# Patient Record
Sex: MALE | Race: WHITE | URBAN - METROPOLITAN AREA
[De-identification: names, ages, dates, MRNs, and addresses within clinical notes are randomized per-mention and may not be internally consistent; named-entity substitution may affect disease eponyms.]

---

## 2018-05-21 ENCOUNTER — OFFICE VISIT (OUTPATIENT)
Dept: FAMILY MEDICINE | Facility: CLINIC | Age: 48
End: 2018-05-21
Payer: COMMERCIAL

## 2018-05-21 VITALS
DIASTOLIC BLOOD PRESSURE: 73 MMHG | WEIGHT: 169 LBS | SYSTOLIC BLOOD PRESSURE: 112 MMHG | BODY MASS INDEX: 25.03 KG/M2 | OXYGEN SATURATION: 99 % | HEART RATE: 77 BPM | HEIGHT: 69 IN | RESPIRATION RATE: 18 BRPM | TEMPERATURE: 96.7 F

## 2018-05-21 DIAGNOSIS — M25.531 RIGHT WRIST PAIN: Primary | ICD-10-CM

## 2018-05-21 PROCEDURE — 99203 OFFICE O/P NEW LOW 30 MIN: CPT | Performed by: INTERNAL MEDICINE

## 2018-05-21 RX ORDER — CLONAZEPAM 1 MG/1
0.5 TABLET ORAL AT BEDTIME
Status: ON HOLD | COMMUNITY
End: 2019-05-14

## 2018-05-21 RX ORDER — PROPRANOLOL HYDROCHLORIDE 40 MG/1
40 TABLET ORAL
COMMUNITY

## 2018-05-21 RX ORDER — CLOZAPINE 100 MG/1
500 TABLET ORAL AT BEDTIME
COMMUNITY

## 2018-05-21 RX ORDER — LEVETIRACETAM 500 MG/1
500 TABLET ORAL 2 TIMES DAILY
COMMUNITY

## 2018-05-21 RX ORDER — MULTIVIT-MIN/FERROUS GLUCONATE 9 MG/15 ML
LIQUID (ML) ORAL
COMMUNITY
Start: 2017-10-20 | End: 2019-05-09

## 2018-05-21 RX ORDER — LEVOTHYROXINE SODIUM 25 UG/1
25 TABLET ORAL
COMMUNITY
Start: 2017-09-15 | End: 2019-05-09

## 2018-05-21 RX ORDER — SENNOSIDES 8.6 MG
TABLET ORAL
COMMUNITY
Start: 2017-09-15 | End: 2019-05-09

## 2018-05-21 RX ORDER — ACETAMINOPHEN 500 MG
500 TABLET ORAL 2 TIMES DAILY PRN
Qty: 30 TABLET | Refills: 1 | Status: SHIPPED | OUTPATIENT
Start: 2018-05-21 | End: 2018-07-23

## 2018-05-21 NOTE — PROGRESS NOTES
SUBJECTIVE:                                                    Octavio Loya is a 48 year old male who presents to clinic today for the following health issues:        ED/UC Followup:    Facility:  [facility]  Date of visit: 5/7/2018  Reason for visit: Wrist pains  Current Status: Right wrist pain symptoms still present         Wrist Pain    Duration:     Since: 2 weeks ago              Specific cause: bended wrist wrong causing wrist sprain    Description:      Location of pain: right wrist     Character of pain: dull     Pain radiation: right hand    Intensity: moderate    History:      Pain interferes with job: yes     History of similar pain problems: no     Any previous MRI or X-rays: yes     Therapies tried without relief: wrist splint    Alleviating factors:      Improved by: rest    Precipitating factors:    Worsened by: range of motion movements at the right wrist    Accompanying Signs & Symptoms: none            Current Medications:     Current Outpatient Prescriptions   Medication Sig Dispense Refill     clonazePAM (KLONOPIN) 1 MG tablet Take 1 mg by mouth       cloZAPine (CLOZARIL) 100 MG tablet Take 500 mg by mouth       levETIRAcetam (KEPPRA) 500 MG tablet Take 500 mg by mouth       levothyroxine (SYNTHROID/LEVOTHROID) 25 MCG tablet Take 25 mcg by mouth       Multiple Vitamins-Minerals (CERTA-SOPHIA) LIQD 1 tab daily       propranolol (INDERAL) 40 MG tablet Take 40 mg by mouth       ranitidine (ZANTAC) 150 MG tablet Take 150 mg by mouth       sennosides (SENOKOT) 8.6 MG tablet 1 TO 3 TABS DAILY FOR CONSTIPATION           Allergies:    No Known Allergies         Past Medical History:     Past Medical History:   Diagnosis Date     Wrist pain, right          Past Surgical History:   History reviewed. No pertinent surgical history.      Family Medical History:   History reviewed. No pertinent family history.      Social History:     Social History     Social History     Marital status: Single      "Spouse name: N/A     Number of children: N/A     Years of education: N/A     Occupational History     Not on file.     Social History Main Topics     Smoking status: Never Smoker     Smokeless tobacco: Current User     Alcohol use No     Drug use: No     Sexual activity: Not Currently     Other Topics Concern     Not on file     Social History Narrative     No narrative on file           Review of System:     Constitutional: Negative for fever or chills  Skin: Negative for rashes  Ears/Nose/Throat: Negative for nasal congestion, sore throat  Respiratory: No shortness of breath, dyspnea on exertion, cough, or hemoptysis  Cardiovascular: Negative for chest pain  Gastrointestinal: Negative for nausea, vomiting  Genitourinary: Negative for dysuria, hematuria  Musculoskeletal: Positive for right wrist pains  Neurologic: Negative for headaches  Psychiatric: Negative for depression, anxiety  Hematologic/Lymphatic/Immunologic: Negative  Endocrine: Negative  Behavioral: Negative for tobacco use       Physical Exam:   /73 (BP Location: Left arm, Patient Position: Chair, Cuff Size: Adult Regular)  Pulse 77  Temp 96.7  F (35.9  C) (Tympanic)  Resp 18  Ht 5' 9\" (1.753 m)  Wt 169 lb (76.7 kg)  SpO2 99%  BMI 24.96 kg/m2    GENERAL: alert and no distress  EYES: eyes grossly normal to inspection, and conjunctivae and sclerae normal  HENT: Normocephalic atraumatic. Nose and mouth without ulcers or lesions  NECK: supple  RESP: lungs clear to auscultation   CV: regular rate and rhythm, normal S1 S2  LYMPH: no peripheral edema   ABDOMEN: nondistended  MS: right wrist pains noted with range of motion movements at the right wrist, pain extending to the right hand  SKIN: no suspicious lesions or rashes  NEURO: Alert & Oriented x 3.   PSYCH: mentation appears normal, affect normal        Diagnostic Test Results:     XR WRIST RT 3 VIEW5/7/2018  [facility]   Result Impression   IMPRESSION:    Normal exam.   Result " Narrative   EXAM: X-RAY WRIST    DATE: 5/7/2018 10:24 AM    COMPARISON: None    CLINICAL DATA: Increasing right wrist pain.    ADDITIONAL CLINICAL DATA: M25.531 Pain in right wrist    TECHNIQUE: Frontal, oblique, and lateral views of the right wrist were obtained.    FINDINGS:     No fracture or dislocation. No joint space narrowing, osteophytosis, bony erosions, or periosteal reaction.         ASSESSMENT/PLAN:       (M25.531) Right wrist pain  (primary encounter diagnosis)  Comment: persistent right wrist pain since 2 weeks ago, recent outside Xray obtained at [facility] was negative for acute fractures or dislocation.  Plan: I have ordered ORTHO  REFERRAL with [facility] ortho clinic for further evaluation. In the meantime, I have also prescribed low dose acetaminophen (TYLENOL) 500 MG tablet 2xDay as needed for pain relief.    Follow Up Plan:     Patient is instructed to return to Internal Medicine clinic for follow-up visit on an as-needed basis going forward.        Olga Silva MD  Internal Medicine  [facility]

## 2018-05-21 NOTE — MR AVS SNAPSHOT
After Visit Summary   5/21/2018    Octavio Loya    MRN: [id number]           Patient Information     Date Of Birth          1970        Visit Information        Provider Department      5/21/2018 11:40 AM Olga Silva MD [facility]        Today's Diagnoses     Right wrist pain    -  1       Follow-ups after your visit        Additional Services     ORTHO  REFERRAL       [facility] is referring you to the Orthopedic  Services at [facility].       The  Representative will assist you in the coordination of your Orthopedic and Musculoskeletal Care as prescribed by your physician.    The  Representative will call you within 1 business day to help schedule your appointment, or you may contact the  Representative at:    All areas ~ [phone]     Type of Referral : Surgical / Specialist       Timeframe requested: 3 - 5 days    Coverage of these services is subject to the terms and limitations of your health insurance plan.  Please call member services at your health plan with any benefit or coverage questions.      If X-rays, CT or MRI's have been performed, please contact the facility where they were done to arrange for , prior to your scheduled appointment.  Please bring this referral request to your appointment and present it to your specialist.                  Your next 10 appointments already scheduled     May 25, 2018 10:30 AM CDT   New Visit with Hayden Randle MD   [facility] ORTHOPEDIC SURGERY ([facility])    [address] [address]   [phone]              Who to contact     If you have questions or need follow up information about today's clinic visit or your schedule please contact [facility] directly at [phone].  Normal or non-critical lab and imaging results will be communicated to you by Teri  "letter or phone within 4 business days after the clinic has received the results. If you do not hear from us within 7 days, please contact the clinic through [email or web address] or phone. If you have a critical or abnormal lab result, we will notify you by phone as soon as possible.  Submit refill requests through [email or web address] or call your pharmacy and they will forward the refill request to us. Please allow 3 business days for your refill to be completed.          Additional Information About Your Visit        [email or web address] Information     [email or web address] lets you send messages to your doctor, view your test results, renew your prescriptions, schedule appointments and more. To sign up, go to [email or web address] . Click on \"Log in\" on the left side of the screen, which will take you to the Welcome page. Then click on \"Sign up Now\" on the right side of the page.     You will be asked to enter the access code listed below, as well as some personal information. Please follow the directions to create your username and password.     Your access code is: [email or web address]  Expires: 2018 12:22 PM     Your access code will  in 90 days. If you need help or a new code, please call your [facility] clinic or [phone].        Care EveryWhere ID     This is your Care EveryWhere ID. This could be used by other organizations to access your [facility] medical records  [id number]        Your Vitals Were     Pulse Temperature Respirations Height Pulse Oximetry BMI (Body Mass Index)    77 96.7  F (35.9  C) (Tympanic) 18 5' 9\" (1.753 m) 99% 24.96 kg/m2       Blood Pressure from Last 3 Encounters:   18 112/73    Weight from Last 3 Encounters:   18 169 lb (76.7 kg)              We Performed the Following     ORTHO  REFERRAL          Today's Medication Changes          These changes are accurate as of 18 12:22 PM.  If you have any questions, ask your nurse or doctor.               Start taking these medicines.        Dose/Directions "    acetaminophen 500 MG tablet   Commonly known as:  TYLENOL   Used for:  Right wrist pain   Started by:  Olga Silva MD        Dose:  500 mg   Take 1 tablet (500 mg) by mouth 2 times daily as needed for mild pain   Quantity:  30 tablet   Refills:  1            Where to get your medicines      These medications were sent to [facility] [place], [place] - [address] [facility] [address]  [address] [address] [address], [facility] [address]     Phone:  [phone]     acetaminophen 500 MG tablet                Primary Care Provider Office Phone # Fax #    Olga Silva [phone] [address]  [facility] [address]        Equal Access to Services     [facility]: Hadii khloe arellano hadasho Soomaali, waaxda luqadaha, qaybta kaalmada adeegyada, rakesh navarro . So [facility] [phone].    ATENCIÓN: Si habla español, tiene a cisneros disposición servicios gratuitos de asistencia lingüística. [facility] [phone].    We comply with applicable federal civil rights laws and [place] laws. We do not discriminate on the basis of race, color, national origin, age, disability, sex, sexual orientation, or gender identity.            Thank you!     Thank you for choosing [facility]  for your care. Our goal is always to provide you with excellent care. Hearing back from our patients is one way we can continue to improve our services. Please take a few minutes to complete the written survey that you may receive in the mail after your visit with us. Thank you!             Your Updated Medication List - Protect others around you: Learn how to safely use, store and throw away your medicines at www.disposemymeds.org.          This list is accurate as of 5/21/18 12:22 PM.  Always use your most recent med list.                   Brand Name Dispense Instructions for use Diagnosis    acetaminophen 500 MG tablet    TYLENOL    30 tablet    Take 1 tablet (500 mg) by  mouth 2 times daily as needed for mild pain    Right wrist pain       CERTA-SOPHIA Liqd      1 tab daily        clonazePAM 1 MG tablet    klonoPIN     Take 1 mg by mouth        cloZAPine 100 MG tablet    CLOZARIL     Take 500 mg by mouth        levETIRAcetam 500 MG tablet    KEPPRA     Take 500 mg by mouth        levothyroxine 25 MCG tablet    SYNTHROID/LEVOTHROID     Take 25 mcg by mouth        propranolol 40 MG tablet    INDERAL     Take 40 mg by mouth        ranitidine 150 MG tablet    ZANTAC     Take 150 mg by mouth        sennosides 8.6 MG tablet    SENOKOT     1 TO 3 TABS DAILY FOR CONSTIPATION

## 2018-05-25 ENCOUNTER — OFFICE VISIT (OUTPATIENT)
Dept: ORTHOPEDICS | Facility: CLINIC | Age: 48
End: 2018-05-25
Payer: COMMERCIAL

## 2018-05-25 VITALS — BODY MASS INDEX: 24.96 KG/M2 | SYSTOLIC BLOOD PRESSURE: 115 MMHG | WEIGHT: 169 LBS | DIASTOLIC BLOOD PRESSURE: 74 MMHG

## 2018-05-25 DIAGNOSIS — M77.8 RIGHT HAND TENDONITIS: Primary | ICD-10-CM

## 2018-05-25 PROCEDURE — 29125 APPL SHORT ARM SPLINT STATIC: CPT | Performed by: ORTHOPAEDIC SURGERY

## 2018-05-25 PROCEDURE — 99203 OFFICE O/P NEW LOW 30 MIN: CPT | Mod: 25 | Performed by: ORTHOPAEDIC SURGERY

## 2018-05-25 RX ORDER — METHYLPREDNISOLONE 4 MG
TABLET, DOSE PACK ORAL
Qty: 21 TABLET | Refills: 0 | Status: SHIPPED | OUTPATIENT
Start: 2018-05-25 | End: 2019-05-09

## 2018-05-25 NOTE — PATIENT INSTRUCTIONS
A cast is used to protect an injured body part and allow it to heal by limiting the amount of motion occurring around the injury  that cuts off blood flow that does not go away, even when you lift the body part above the level of your heart .     Fever after itching. It may be related to an infection.     Fluid draining from your skin under the cast         Ice 3-4 times daily.   Ibuprofen 200mg, 3 tabs, 3 times daily.

## 2018-05-25 NOTE — PROGRESS NOTES
HISTORY OF PRESENT ILLNESS:    Octavio Loya is a 48 year old male who is seen for right wrist pain on request of .  There is no specific documented trauma.  He basically started out of the blue.  However, it is possible that he might have fallen in the middle of the night since he takes Klonopin.  He does not recall doing anything repetitive.  He works as a .  He has gone through the treatments of Medrol Dosepak and thumb spica wrist splint.  Since the onset of 3 weeks ago, the situation has not improved significantly although swelling is self has improved.  No specific numbness or tingling sensation otherwise.  Present symptoms: Pain and swelling right wrist   treatments tried to this point: Splinting and Medrol Dosepak   orthopedic PMH: None    Past Medical History:   Diagnosis Date     Wrist pain, right        No past surgical history on file.  Schizophrenia disorder he is in a group home    No family history on file.  Father with history of stroke and mother with a history of cancer    Social History     Social History     Marital status: Single     Spouse name: N/A     Number of children: N/A     Years of education: N/A     Occupational History     Not on file.     Social History Main Topics     Smoking status: Never Smoker     Smokeless tobacco: Current User     Alcohol use No     Drug use: No     Sexual activity: Not Currently     Other Topics Concern     Not on file     Social History Narrative       Current Outpatient Prescriptions   Medication Sig Dispense Refill     acetaminophen (TYLENOL) 500 MG tablet Take 1 tablet (500 mg) by mouth 2 times daily as needed for mild pain 30 tablet 1     clonazePAM (KLONOPIN) 1 MG tablet Take 1 mg by mouth       cloZAPine (CLOZARIL) 100 MG tablet Take 500 mg by mouth       levETIRAcetam (KEPPRA) 500 MG tablet Take 500 mg by mouth       levothyroxine (SYNTHROID/LEVOTHROID) 25 MCG tablet Take 25 mcg by mouth       methylPREDNISolone (MEDROL DOSEPAK) 4 MG  tablet Follow package instructions 21 tablet 0     Multiple Vitamins-Minerals (CERTA-SOPHIA) LIQD 1 tab daily       propranolol (INDERAL) 40 MG tablet Take 40 mg by mouth       ranitidine (ZANTAC) 150 MG tablet Take 150 mg by mouth       sennosides (SENOKOT) 8.6 MG tablet 1 TO 3 TABS DAILY FOR CONSTIPATION         No Known Allergies    REVIEW OF SYSTEMS:  CONSTITUTIONAL:  NEGATIVE for fever, chills, change in weight  INTEGUMENTARY/SKIN:  NEGATIVE for worrisome rashes, moles or lesions  EYES:  NEGATIVE for vision changes or irritation  ENT/MOUTH:  NEGATIVE for ear, mouth and throat problems  RESP:  NEGATIVE for significant cough or SOB  BREAST:  NEGATIVE for masses, tenderness or discharge  CV:  NEGATIVE for chest pain, palpitations or peripheral edema  GI: Constipation or reflux   : Frequency  MUSCULOSKELETAL:  See HPI above  NEURO: Dizziness  ENDOCRINE:  NEGATIVE for temperature intolerance, skin/hair changes  HEME/ALLERGY/IMMUNE:  NEGATIVE for bleeding problems  PSYCHIATRIC: History of schizophrenia      PHYSICAL EXAM:  /74 (BP Location: Right arm, Patient Position: Right side, Cuff Size: Adult Regular)  Wt 169 lb (76.7 kg)  BMI 24.96 kg/m2  Body mass index is 24.96 kg/(m^2).   GENERAL APPEARANCE: healthy, alert and no distress   HEENT: No apparent thyroid megaly. Clear sclera with normal ocular movement  RESPIRATORY: No labored breathing  SKIN: no suspicious lesions or rashes  NEURO: Normal strength and tone, mentation intact and speech normal  VASCULAR: Good pulses, and capillary refill   LYMPH: no lymphadenopathy   PSYCH:  mentation appears normal and affect normal/bright    MUSCULOSKELETAL:  Right wrist is a slightly swollen especially over the dorsum  Palpable tenderness over the dorsum of the right wrist and proximal hand  No significant erythema or warmth noted otherwise  Finger movement is well-maintained but is socially with the pain  Wrist itself is not swollen  Swelling is noted to be on the  synovial tissue of the extensor tendons  No specific pain at the anatomic snuffbox  No pain along the first dorsal compartment  Volar side is benign without tenderness at the A1 pulleys or catching or locking    ASSESSMENT:  Right hand extensor tenosynovitis      PLAN:  He will be given a note for him to use left and only for 3 weeks for his work  Another course of Medrol Dosepak  Ice 5-10 minutes 3-4 times a day  Instead of Velcro wrist splint, he will be given a resting Ortho-Glass splint including the fingers so that he can rest more adequately  Follow-up if problem persists.    .    Imaging Interpretation:   None taken today.  The results of x-rays from [facility] was reviewed.    Hayden Randle MD  Department of Orthopedic Surgery        Disclaimer: This note consists of symbols derived from keyboarding, dictation and/or voice recognition software. As a result, there may be errors in the script that have gone undetected. Please consider this when interpreting information found in this chart.

## 2018-05-25 NOTE — LETTER
May 25, 2018      Octavio Loya  [address] [place] [address]        To Whom It May Concern:    Octavio Loya was seen in our clinic. He may return to work with the following: left hand use only  on or about 6/15/18.      Sincerely,        Hayden Randle MD

## 2018-05-25 NOTE — LETTER
5/25/2018         RE: Octavio Loya  [address]  [place] [address]        Dear Colleague,    Thank you for referring your patient, Octavio Loya, to the [facility] ORTHOPEDIC SURGERY. Please see a copy of my visit note below.    HISTORY OF PRESENT ILLNESS:    Octavio Loya is a 48 year old male who is seen for right wrist pain on request of .  There is no specific documented trauma.  He basically started out of the blue.  However, it is possible that he might have fallen in the middle of the night since he takes Klonopin.  He does not recall doing anything repetitive.  He works as a .  He has gone through the treatments of Medrol Dosepak and thumb spica wrist splint.  Since the onset of 3 weeks ago, the situation has not improved significantly although swelling is self has improved.  No specific numbness or tingling sensation otherwise.  Present symptoms: Pain and swelling right wrist   treatments tried to this point: Splinting and Medrol Dosepak   orthopedic PMH: None    Past Medical History:   Diagnosis Date     Wrist pain, right        No past surgical history on file.  Schizophrenia disorder he is in a group home    No family history on file.  Father with history of stroke and mother with a history of cancer    Social History     Social History     Marital status: Single     Spouse name: N/A     Number of children: N/A     Years of education: N/A     Occupational History     Not on file.     Social History Main Topics     Smoking status: Never Smoker     Smokeless tobacco: Current User     Alcohol use No     Drug use: No     Sexual activity: Not Currently     Other Topics Concern     Not on file     Social History Narrative     No narrative on file       Current Outpatient Prescriptions   Medication Sig Dispense Refill     acetaminophen (TYLENOL) 500 MG tablet Take 1 tablet (500 mg) by mouth 2 times daily as needed for mild pain 30 tablet 1     clonazePAM (KLONOPIN) 1 MG  tablet Take 1 mg by mouth       cloZAPine (CLOZARIL) 100 MG tablet Take 500 mg by mouth       levETIRAcetam (KEPPRA) 500 MG tablet Take 500 mg by mouth       levothyroxine (SYNTHROID/LEVOTHROID) 25 MCG tablet Take 25 mcg by mouth       methylPREDNISolone (MEDROL DOSEPAK) 4 MG tablet Follow package instructions 21 tablet 0     Multiple Vitamins-Minerals (CERTA-SOPHIA) LIQD 1 tab daily       propranolol (INDERAL) 40 MG tablet Take 40 mg by mouth       ranitidine (ZANTAC) 150 MG tablet Take 150 mg by mouth       sennosides (SENOKOT) 8.6 MG tablet 1 TO 3 TABS DAILY FOR CONSTIPATION         No Known Allergies    REVIEW OF SYSTEMS:  CONSTITUTIONAL:  NEGATIVE for fever, chills, change in weight  INTEGUMENTARY/SKIN:  NEGATIVE for worrisome rashes, moles or lesions  EYES:  NEGATIVE for vision changes or irritation  ENT/MOUTH:  NEGATIVE for ear, mouth and throat problems  RESP:  NEGATIVE for significant cough or SOB  BREAST:  NEGATIVE for masses, tenderness or discharge  CV:  NEGATIVE for chest pain, palpitations or peripheral edema  GI: Constipation or reflux   : Frequency  MUSCULOSKELETAL:  See HPI above  NEURO: Dizziness  ENDOCRINE:  NEGATIVE for temperature intolerance, skin/hair changes  HEME/ALLERGY/IMMUNE:  NEGATIVE for bleeding problems  PSYCHIATRIC: History of schizophrenia      PHYSICAL EXAM:  There were no vitals taken for this visit.  There is no height or weight on file to calculate BMI.   GENERAL APPEARANCE: healthy, alert and no distress   HEENT: No apparent thyroid megaly. Clear sclera with normal ocular movement  RESPIRATORY: No labored breathing  SKIN: no suspicious lesions or rashes  NEURO: Normal strength and tone, mentation intact and speech normal  VASCULAR: Good pulses, and capillary refill   LYMPH: no lymphadenopathy   PSYCH:  mentation appears normal and affect normal/bright    MUSCULOSKELETAL:  Right wrist is a slightly swollen especially over the dorsum  Palpable tenderness over the dorsum of the  right wrist and proximal hand  No significant erythema or warmth noted otherwise  Finger movement is well-maintained but is socially with the pain  Wrist itself is not swollen  Swelling is noted to be on the synovial tissue of the extensor tendons  No specific pain at the anatomic snuffbox  No pain along the first dorsal compartment  Volar side is benign without tenderness at the A1 pulleys or catching or locking    ASSESSMENT:  Right hand extensor tenosynovitis      PLAN:  He will be given a note for him to use left and only for 3 weeks for his work  Another course of Medrol Dosepak  Ice 5-10 minutes 3-4 times a day  Instead of Velcro wrist splint, he will be given a resting Ortho-Glass splint including the fingers so that he can rest more adequately  Follow-up if problem persists.    .    Imaging Interpretation:   None taken today.  The results of x-rays from [facility] was reviewed.    Hayden Randle MD  Department of Orthopedic Surgery        Disclaimer: This note consists of symbols derived from keyboarding, dictation and/or voice recognition software. As a result, there may be errors in the script that have gone undetected. Please consider this when interpreting information found in this chart.    Again, thank you for allowing me to participate in the care of your patient.        Sincerely,        Hayden Randle MD

## 2018-05-25 NOTE — MR AVS SNAPSHOT
"              After Visit Summary   5/25/2018    Octavio Loya    MRN: [id number]           Patient Information     Date Of Birth          1970        Visit Information        Provider Department      5/25/2018 10:30 AM Hayden Randle MD [facility]        Today's Diagnoses     Right hand tendonitis    -  1      Care Instructions      A cast is used to protect an injured body part and allow it to heal by limiting the amount of motion occurring around the injury  that cuts off blood flow that does not go away, even when you lift the body part above the level of your heart .     Fever after itching. It may be related to an infection.     Fluid draining from your skin under the cast         Ice 3-4 times daily.   Ibuprofen 200mg, 3 tabs, 3 times daily.             Follow-ups after your visit        Who to contact     If you have questions or need follow up information about today's clinic visit or your schedule please contact [facility] directly at [phone].  Normal or non-critical lab and imaging results will be communicated to you by InnoPadhart, letter or phone within 4 business days after the clinic has received the results. If you do not hear from us within 7 days, please contact the clinic through [facility] [place] [facility] or phone. If you have a critical or abnormal lab result, we will notify you by phone as soon as possible.  Submit refill requests through [employer] or call your pharmacy and they will forward the refill request to us. Please allow 3 business days for your refill to be completed.          Additional Information About Your Visit        InnoPadharGreystripe Information     [employer] lets you send messages to your doctor, view your test results, renew your prescriptions, schedule appointments and more. To sign up, go to [email or web address] . Click on \"Log in\" on the left side of the screen, which will take you to the Welcome page. Then click on \"Sign up Now\" on the right " side of the page.     You will be asked to enter the access code listed below, as well as some personal information. Please follow the directions to create your username and password.     Your access code is: [email or web address]  Expires: 2018 12:22 PM     Your access code will  in 90 days. If you need help or a new code, please call your [place] clinic or [phone].        Care EveryWhere ID     This is your Care EveryWhere ID. This could be used by other organizations to access your [place] medical records  [id number]         Blood Pressure from Last 3 Encounters:   18 112/73    Weight from Last 3 Encounters:   18 169 lb (76.7 kg)              Today, you had the following     No orders found for display         Today's Medication Changes          These changes are accurate as of 18 11:18 AM.  If you have any questions, ask your nurse or doctor.               Start taking these medicines.        Dose/Directions    methylPREDNISolone 4 MG tablet   Commonly known as:  MEDROL DOSEPAK   Used for:  Right hand tendonitis        Follow package instructions   Quantity:  21 tablet   Refills:  0            Where to get your medicines      These medications were sent to [facility] [address]  [address], [facility] [address]     Phone:  [phone]     methylPREDNISolone 4 MG tablet                Primary Care Provider Office Phone # Fax #    Olga Patricio Silva [phone] [address]  [facility] [address]        Equal Access to Services     [facility] Hospital[facility]IL[facility] AH: Hadii aad ku hadasho Soomaali, waaxda luqadaha, qaybta kaalmada adeegyada, rakesh schaefer. So [facility] [phone].    ATENCIÓN: Si habla español, tiene a cisneros disposición servicios gratuitos de asistencia lingüística. Llame al [phone].    We comply with applicable federal civil rights laws and [place] laws. We do not discriminate  on the basis of race, color, national origin, age, disability, sex, sexual orientation, or gender identity.            Thank you!     Thank you for choosing [facility]  for your care. Our goal is always to provide you with excellent care. Hearing back from our patients is one way we can continue to improve our services. Please take a few minutes to complete the written survey that you may receive in the mail after your visit with us. Thank you!             Your Updated Medication List - Protect others around you: Learn how to safely use, store and throw away your medicines at www.disposemymeds.org.          This list is accurate as of 5/25/18 11:18 AM.  Always use your most recent med list.                   Brand Name Dispense Instructions for use Diagnosis    acetaminophen 500 MG tablet    TYLENOL    30 tablet    Take 1 tablet (500 mg) by mouth 2 times daily as needed for mild pain    Right wrist pain       CERTA-SOPHIA Liqd      1 tab daily        clonazePAM 1 MG tablet    klonoPIN     Take 1 mg by mouth        cloZAPine 100 MG tablet    CLOZARIL     Take 500 mg by mouth        levETIRAcetam 500 MG tablet    KEPPRA     Take 500 mg by mouth        levothyroxine 25 MCG tablet    SYNTHROID/LEVOTHROID     Take 25 mcg by mouth        methylPREDNISolone 4 MG tablet    MEDROL DOSEPAK    21 tablet    Follow package instructions    Right hand tendonitis       propranolol 40 MG tablet    INDERAL     Take 40 mg by mouth        ranitidine 150 MG tablet    ZANTAC     Take 150 mg by mouth        sennosides 8.6 MG tablet    SENOKOT     1 TO 3 TABS DAILY FOR CONSTIPATION

## 2018-07-23 DIAGNOSIS — M25.531 RIGHT WRIST PAIN: ICD-10-CM

## 2018-07-25 RX ORDER — ACETAMINOPHEN 500 MG
500 TABLET ORAL 2 TIMES DAILY PRN
Qty: 30 TABLET | Refills: 1 | Status: SHIPPED | OUTPATIENT
Start: 2018-07-25 | End: 2019-05-09

## 2018-07-25 NOTE — TELEPHONE ENCOUNTER
"    Requested Prescriptions   Pending Prescriptions Disp Refills     acetaminophen (TYLENOL) 500 MG tablet 30 tablet 1     Sig: Take 1 tablet (500 mg) by mouth 2 times daily as needed for mild pain    Analgesics (Non-Narcotic Tylenol and ASA Only) Passed    7/23/2018  3:06 PM       Passed - Recent (12 mo) or future (30 days) visit within the authorizing provider's specialty    Patient had office visit in the last 12 months or has a visit in the next 30 days with authorizing provider or within the authorizing provider's specialty.  See \"Patient Info\" tab in inbasket, or \"Choose Columns\" in Meds & Orders section of the refill encounter.           Passed - Patient is 7 months old or older    If patient is a peds patient of the age 7 mos -12 years, ok to refill using weight-based dosing.     If >3g daily and/or sig is not \"prn\", check for liver enzymes. If normal in the last year, ok to refill.  If not, refer to the provider.            "

## 2018-07-25 NOTE — TELEPHONE ENCOUNTER
Prescription approved per [facility] [place].  Nicole Kapadia RN- Triage [employer]

## 2019-05-09 ENCOUNTER — APPOINTMENT (OUTPATIENT)
Dept: CT IMAGING | Facility: CLINIC | Age: 49
End: 2019-05-09
Attending: EMERGENCY MEDICINE
Payer: COMMERCIAL

## 2019-05-09 ENCOUNTER — HOSPITAL ENCOUNTER (INPATIENT)
Facility: CLINIC | Age: 49
LOS: 5 days | Discharge: NURSING, REHAB OR LONG-TERM CARE | End: 2019-05-15
Attending: EMERGENCY MEDICINE | Admitting: INTERNAL MEDICINE
Payer: COMMERCIAL

## 2019-05-09 ENCOUNTER — APPOINTMENT (OUTPATIENT)
Dept: GENERAL RADIOLOGY | Facility: CLINIC | Age: 49
End: 2019-05-09
Attending: EMERGENCY MEDICINE
Payer: COMMERCIAL

## 2019-05-09 DIAGNOSIS — G89.18 POSTOPERATIVE PAIN: Primary | ICD-10-CM

## 2019-05-09 DIAGNOSIS — F25.1 SCHIZOAFFECTIVE DISORDER, DEPRESSIVE TYPE (H): ICD-10-CM

## 2019-05-09 DIAGNOSIS — B96.20 E COLI BACTEREMIA: ICD-10-CM

## 2019-05-09 DIAGNOSIS — R78.81 E COLI BACTEREMIA: ICD-10-CM

## 2019-05-09 DIAGNOSIS — A41.9 SEPSIS, DUE TO UNSPECIFIED ORGANISM: ICD-10-CM

## 2019-05-09 PROBLEM — G93.40 ENCEPHALOPATHY: Status: ACTIVE | Noted: 2019-05-09

## 2019-05-09 LAB
ALBUMIN SERPL-MCNC: 3.2 G/DL (ref 3.4–5)
ALBUMIN UR-MCNC: 10 MG/DL
ALP SERPL-CCNC: 140 U/L (ref 40–150)
ALT SERPL W P-5'-P-CCNC: 18 U/L (ref 0–70)
AMPHETAMINES UR QL SCN: NEGATIVE
ANION GAP SERPL CALCULATED.3IONS-SCNC: 12 MMOL/L (ref 3–14)
APAP SERPL-MCNC: <2 MG/L (ref 10–20)
APPEARANCE CSF: CLEAR
APPEARANCE UR: CLEAR
AST SERPL W P-5'-P-CCNC: 12 U/L (ref 0–45)
BACTERIA #/AREA URNS HPF: ABNORMAL /HPF
BARBITURATES UR QL: NEGATIVE
BASOPHILS # BLD AUTO: 0 10E9/L (ref 0–0.2)
BASOPHILS NFR BLD AUTO: 0 %
BENZODIAZ UR QL: NEGATIVE
BILIRUB SERPL-MCNC: 1.1 MG/DL (ref 0.2–1.3)
BILIRUB UR QL STRIP: NEGATIVE
BUN SERPL-MCNC: 18 MG/DL (ref 7–30)
CALCIUM SERPL-MCNC: 9.1 MG/DL (ref 8.5–10.1)
CANNABINOIDS UR QL SCN: NEGATIVE
CHLORIDE SERPL-SCNC: 100 MMOL/L (ref 94–109)
CO2 SERPL-SCNC: 20 MMOL/L (ref 20–32)
COCAINE UR QL: NEGATIVE
COLOR CSF: COLORLESS
COLOR UR AUTO: YELLOW
CREAT SERPL-MCNC: 1.37 MG/DL (ref 0.66–1.25)
DIFFERENTIAL METHOD BLD: ABNORMAL
EOSINOPHIL # BLD AUTO: 0 10E9/L (ref 0–0.7)
EOSINOPHIL NFR BLD AUTO: 0 %
ERYTHROCYTE [DISTWIDTH] IN BLOOD BY AUTOMATED COUNT: 13.6 % (ref 10–15)
ETHANOL SERPL-MCNC: <0.01 G/DL
GFR SERPL CREATININE-BSD FRML MDRD: 60 ML/MIN/{1.73_M2}
GLUCOSE CSF-MCNC: 76 MG/DL (ref 40–70)
GLUCOSE SERPL-MCNC: 246 MG/DL (ref 70–99)
GLUCOSE UR STRIP-MCNC: NEGATIVE MG/DL
GRAM STN SPEC: NORMAL
HCT VFR BLD AUTO: 36 % (ref 40–53)
HGB BLD-MCNC: 12.5 G/DL (ref 13.3–17.7)
HGB UR QL STRIP: NEGATIVE
IMM GRANULOCYTES # BLD: 0.1 10E9/L (ref 0–0.4)
IMM GRANULOCYTES NFR BLD: 0.4 %
INTERPRETATION ECG - MUSE: NORMAL
KETONES UR STRIP-MCNC: NEGATIVE MG/DL
LACTATE BLD-SCNC: 1.2 MMOL/L (ref 0.7–2)
LACTATE SERPL-SCNC: 3.6 MMOL/L (ref 0.4–2)
LEUKOCYTE ESTERASE UR QL STRIP: NEGATIVE
LYMPHOCYTES # BLD AUTO: 0.6 10E9/L (ref 0.8–5.3)
LYMPHOCYTES NFR BLD AUTO: 3.1 %
Lab: NORMAL
MCH RBC QN AUTO: 29.8 PG (ref 26.5–33)
MCHC RBC AUTO-ENTMCNC: 34.7 G/DL (ref 31.5–36.5)
MCV RBC AUTO: 86 FL (ref 78–100)
MONOCYTES # BLD AUTO: 2.3 10E9/L (ref 0–1.3)
MONOCYTES NFR BLD AUTO: 11.5 %
NEUTROPHILS # BLD AUTO: 17.2 10E9/L (ref 1.6–8.3)
NEUTROPHILS NFR BLD AUTO: 85 %
NITRATE UR QL: NEGATIVE
NRBC # BLD AUTO: 0 10*3/UL
NRBC BLD AUTO-RTO: 0 /100
OPIATES UR QL SCN: NEGATIVE
PCP UR QL SCN: NEGATIVE
PH UR STRIP: 6.5 PH (ref 5–7)
PLATELET # BLD AUTO: 114 10E9/L (ref 150–450)
POTASSIUM SERPL-SCNC: 3.3 MMOL/L (ref 3.4–5.3)
PROT CSF-MCNC: 51 MG/DL (ref 15–60)
PROT SERPL-MCNC: 7.7 G/DL (ref 6.8–8.8)
RBC # BLD AUTO: 4.2 10E12/L (ref 4.4–5.9)
RBC # CSF MANUAL: 9 /UL (ref 0–2)
RBC #/AREA URNS AUTO: <1 /HPF (ref 0–2)
SALICYLATES SERPL-MCNC: <2 MG/DL
SODIUM SERPL-SCNC: 132 MMOL/L (ref 133–144)
SOURCE: ABNORMAL
SP GR UR STRIP: 1.01 (ref 1–1.03)
SPECIMEN SOURCE: NORMAL
TUBE # CSF: 4 #
UROBILINOGEN UR STRIP-MCNC: NORMAL MG/DL (ref 0–2)
WBC # BLD AUTO: 20.3 10E9/L (ref 4–11)
WBC # CSF MANUAL: 4 /UL (ref 0–5)
WBC #/AREA URNS AUTO: 1 /HPF (ref 0–5)

## 2019-05-09 PROCEDURE — 87800 DETECT AGNT MULT DNA DIREC: CPT | Performed by: EMERGENCY MEDICINE

## 2019-05-09 PROCEDURE — 80320 DRUG SCREEN QUANTALCOHOLS: CPT | Performed by: EMERGENCY MEDICINE

## 2019-05-09 PROCEDURE — 80053 COMPREHEN METABOLIC PANEL: CPT | Performed by: EMERGENCY MEDICINE

## 2019-05-09 PROCEDURE — G0378 HOSPITAL OBSERVATION PER HR: HCPCS

## 2019-05-09 PROCEDURE — 87040 BLOOD CULTURE FOR BACTERIA: CPT | Performed by: EMERGENCY MEDICINE

## 2019-05-09 PROCEDURE — 93005 ELECTROCARDIOGRAM TRACING: CPT

## 2019-05-09 PROCEDURE — 96361 HYDRATE IV INFUSION ADD-ON: CPT

## 2019-05-09 PROCEDURE — 40000936 ZZH STATISTIC OUTPATIENT (NON-OBS) NIGHT

## 2019-05-09 PROCEDURE — 62270 DX LMBR SPI PNXR: CPT

## 2019-05-09 PROCEDURE — 87205 SMEAR GRAM STAIN: CPT | Performed by: EMERGENCY MEDICINE

## 2019-05-09 PROCEDURE — 87015 SPECIMEN INFECT AGNT CONCNTJ: CPT | Performed by: EMERGENCY MEDICINE

## 2019-05-09 PROCEDURE — 25800030 ZZH RX IP 258 OP 636: Performed by: INTERNAL MEDICINE

## 2019-05-09 PROCEDURE — 80329 ANALGESICS NON-OPIOID 1 OR 2: CPT | Performed by: EMERGENCY MEDICINE

## 2019-05-09 PROCEDURE — 71046 X-RAY EXAM CHEST 2 VIEWS: CPT

## 2019-05-09 PROCEDURE — 87077 CULTURE AEROBIC IDENTIFY: CPT | Performed by: EMERGENCY MEDICINE

## 2019-05-09 PROCEDURE — 82945 GLUCOSE OTHER FLUID: CPT | Performed by: EMERGENCY MEDICINE

## 2019-05-09 PROCEDURE — 40000935 ZZH STATISTIC OUTPATIENT (NON-OBS) EVE

## 2019-05-09 PROCEDURE — 85025 COMPLETE CBC W/AUTO DIFF WBC: CPT | Performed by: EMERGENCY MEDICINE

## 2019-05-09 PROCEDURE — 84157 ASSAY OF PROTEIN OTHER: CPT | Performed by: EMERGENCY MEDICINE

## 2019-05-09 PROCEDURE — 70450 CT HEAD/BRAIN W/O DYE: CPT

## 2019-05-09 PROCEDURE — 99220 ZZC INITIAL OBSERVATION CARE,LEVL III: CPT | Performed by: INTERNAL MEDICINE

## 2019-05-09 PROCEDURE — 81001 URINALYSIS AUTO W/SCOPE: CPT | Performed by: EMERGENCY MEDICINE

## 2019-05-09 PROCEDURE — 25000128 H RX IP 250 OP 636: Performed by: EMERGENCY MEDICINE

## 2019-05-09 PROCEDURE — 89050 BODY FLUID CELL COUNT: CPT | Performed by: EMERGENCY MEDICINE

## 2019-05-09 PROCEDURE — 009U3ZX DRAINAGE OF SPINAL CANAL, PERCUTANEOUS APPROACH, DIAGNOSTIC: ICD-10-PCS | Performed by: EMERGENCY MEDICINE

## 2019-05-09 PROCEDURE — 99285 EMERGENCY DEPT VISIT HI MDM: CPT | Mod: 25

## 2019-05-09 PROCEDURE — 96365 THER/PROPH/DIAG IV INF INIT: CPT

## 2019-05-09 PROCEDURE — 25000132 ZZH RX MED GY IP 250 OP 250 PS 637: Performed by: INTERNAL MEDICINE

## 2019-05-09 PROCEDURE — 80307 DRUG TEST PRSMV CHEM ANLYZR: CPT | Performed by: EMERGENCY MEDICINE

## 2019-05-09 PROCEDURE — 87529 HSV DNA AMP PROBE: CPT | Performed by: INTERNAL MEDICINE

## 2019-05-09 PROCEDURE — 40000061 ZZH STATISTIC EEG TIME EA 10 MIN

## 2019-05-09 PROCEDURE — 95816 EEG AWAKE AND DROWSY: CPT

## 2019-05-09 PROCEDURE — 87186 SC STD MICRODIL/AGAR DIL: CPT | Performed by: EMERGENCY MEDICINE

## 2019-05-09 PROCEDURE — 87070 CULTURE OTHR SPECIMN AEROBIC: CPT | Performed by: EMERGENCY MEDICINE

## 2019-05-09 PROCEDURE — 83605 ASSAY OF LACTIC ACID: CPT | Performed by: EMERGENCY MEDICINE

## 2019-05-09 RX ORDER — AMOXICILLIN 250 MG
2 CAPSULE ORAL 2 TIMES DAILY PRN
Status: DISCONTINUED | OUTPATIENT
Start: 2019-05-09 | End: 2019-05-12

## 2019-05-09 RX ORDER — SODIUM CHLORIDE 9 MG/ML
INJECTION, SOLUTION INTRAVENOUS CONTINUOUS
Status: DISCONTINUED | OUTPATIENT
Start: 2019-05-09 | End: 2019-05-10

## 2019-05-09 RX ORDER — ACETAMINOPHEN 325 MG/1
650 TABLET ORAL EVERY 4 HOURS PRN
Status: DISCONTINUED | OUTPATIENT
Start: 2019-05-09 | End: 2019-05-15 | Stop reason: HOSPADM

## 2019-05-09 RX ORDER — POTASSIUM CHLORIDE 29.8 MG/ML
20 INJECTION INTRAVENOUS
Status: DISCONTINUED | OUTPATIENT
Start: 2019-05-09 | End: 2019-05-15 | Stop reason: HOSPADM

## 2019-05-09 RX ORDER — SODIUM CHLORIDE, SODIUM LACTATE, POTASSIUM CHLORIDE, CALCIUM CHLORIDE 600; 310; 30; 20 MG/100ML; MG/100ML; MG/100ML; MG/100ML
INJECTION, SOLUTION INTRAVENOUS CONTINUOUS
Status: CANCELLED | OUTPATIENT
Start: 2019-05-09

## 2019-05-09 RX ORDER — ONDANSETRON 2 MG/ML
4 INJECTION INTRAMUSCULAR; INTRAVENOUS EVERY 6 HOURS PRN
Status: DISCONTINUED | OUTPATIENT
Start: 2019-05-09 | End: 2019-05-15 | Stop reason: HOSPADM

## 2019-05-09 RX ORDER — POTASSIUM CHLORIDE 7.45 MG/ML
10 INJECTION INTRAVENOUS
Status: DISCONTINUED | OUTPATIENT
Start: 2019-05-09 | End: 2019-05-15 | Stop reason: HOSPADM

## 2019-05-09 RX ORDER — BISACODYL 10 MG
10 SUPPOSITORY, RECTAL RECTAL DAILY PRN
Status: DISCONTINUED | OUTPATIENT
Start: 2019-05-09 | End: 2019-05-15 | Stop reason: HOSPADM

## 2019-05-09 RX ORDER — POTASSIUM CHLORIDE 1500 MG/1
20-40 TABLET, EXTENDED RELEASE ORAL
Status: DISCONTINUED | OUTPATIENT
Start: 2019-05-09 | End: 2019-05-15 | Stop reason: HOSPADM

## 2019-05-09 RX ORDER — POTASSIUM CHLORIDE 1.5 G/1.58G
20-40 POWDER, FOR SOLUTION ORAL
Status: DISCONTINUED | OUTPATIENT
Start: 2019-05-09 | End: 2019-05-15 | Stop reason: HOSPADM

## 2019-05-09 RX ORDER — AMOXICILLIN 250 MG
1 CAPSULE ORAL 2 TIMES DAILY PRN
Status: DISCONTINUED | OUTPATIENT
Start: 2019-05-09 | End: 2019-05-12

## 2019-05-09 RX ORDER — SENNOSIDES 8.6 MG
1 TABLET ORAL 2 TIMES DAILY PRN
COMMUNITY

## 2019-05-09 RX ORDER — BUSPIRONE HYDROCHLORIDE 30 MG/1
30 TABLET ORAL 2 TIMES DAILY
COMMUNITY

## 2019-05-09 RX ORDER — POLYETHYLENE GLYCOL 3350 17 G/17G
17 POWDER, FOR SOLUTION ORAL DAILY PRN
Status: DISCONTINUED | OUTPATIENT
Start: 2019-05-09 | End: 2019-05-15 | Stop reason: HOSPADM

## 2019-05-09 RX ORDER — MULTIVITAMIN,THERAPEUTIC
1 TABLET ORAL DAILY
COMMUNITY

## 2019-05-09 RX ORDER — ACETAMINOPHEN 650 MG/1
650 SUPPOSITORY RECTAL EVERY 4 HOURS PRN
Status: DISCONTINUED | OUTPATIENT
Start: 2019-05-09 | End: 2019-05-15 | Stop reason: HOSPADM

## 2019-05-09 RX ORDER — DIAZEPAM 10 MG/2ML
2.5 INJECTION, SOLUTION INTRAMUSCULAR; INTRAVENOUS ONCE
Status: DISCONTINUED | OUTPATIENT
Start: 2019-05-09 | End: 2019-05-09

## 2019-05-09 RX ORDER — CLOZAPINE 100 MG/1
300 TABLET ORAL EVERY EVENING
COMMUNITY

## 2019-05-09 RX ORDER — CEFTRIAXONE 2 G/1
2 INJECTION, POWDER, FOR SOLUTION INTRAMUSCULAR; INTRAVENOUS ONCE
Status: COMPLETED | OUTPATIENT
Start: 2019-05-09 | End: 2019-05-09

## 2019-05-09 RX ORDER — ONDANSETRON 4 MG/1
4 TABLET, ORALLY DISINTEGRATING ORAL EVERY 6 HOURS PRN
Status: DISCONTINUED | OUTPATIENT
Start: 2019-05-09 | End: 2019-05-15 | Stop reason: HOSPADM

## 2019-05-09 RX ORDER — POTASSIUM CL/LIDO/0.9 % NACL 10MEQ/0.1L
10 INTRAVENOUS SOLUTION, PIGGYBACK (ML) INTRAVENOUS
Status: DISCONTINUED | OUTPATIENT
Start: 2019-05-09 | End: 2019-05-15 | Stop reason: HOSPADM

## 2019-05-09 RX ORDER — NALOXONE HYDROCHLORIDE 0.4 MG/ML
.1-.4 INJECTION, SOLUTION INTRAMUSCULAR; INTRAVENOUS; SUBCUTANEOUS
Status: DISCONTINUED | OUTPATIENT
Start: 2019-05-09 | End: 2019-05-11

## 2019-05-09 RX ADMIN — SODIUM CHLORIDE 1000 ML: 9 INJECTION, SOLUTION INTRAVENOUS at 08:18

## 2019-05-09 RX ADMIN — SODIUM CHLORIDE 1000 ML: 9 INJECTION, SOLUTION INTRAVENOUS at 09:25

## 2019-05-09 RX ADMIN — POTASSIUM CHLORIDE 20 MEQ: 1500 TABLET, EXTENDED RELEASE ORAL at 21:17

## 2019-05-09 RX ADMIN — RANITIDINE 150 MG: 150 TABLET ORAL at 21:17

## 2019-05-09 RX ADMIN — CEFTRIAXONE SODIUM 2 G: 2 INJECTION, POWDER, FOR SOLUTION INTRAMUSCULAR; INTRAVENOUS at 10:34

## 2019-05-09 RX ADMIN — ACETAMINOPHEN 650 MG: 325 TABLET, FILM COATED ORAL at 16:29

## 2019-05-09 RX ADMIN — POTASSIUM CHLORIDE 40 MEQ: 1500 TABLET, EXTENDED RELEASE ORAL at 18:46

## 2019-05-09 RX ADMIN — ACETAMINOPHEN 650 MG: 325 TABLET, FILM COATED ORAL at 21:17

## 2019-05-09 RX ADMIN — SODIUM CHLORIDE: 9 INJECTION, SOLUTION INTRAVENOUS at 16:21

## 2019-05-09 NOTE — PROGRESS NOTES
RECEIVING UNIT ED HANDOFF REVIEW    ED Nurse Handoff Report was reviewed by: Sadia Sher on May 9, 2019 at 2:56 PM

## 2019-05-09 NOTE — ED NOTES
"[facility]  ED Nurse Handoff Report    ED Chief complaint: Balance/ Vestibular      ED Diagnosis:   Final diagnoses:   Sepsis, due to unspecified organism (H)       Code Status: Full Code    Allergies:   Allergies   Allergen Reactions     Depakote [Valproic Acid]        Activity level - Baseline/Home:  Independent    Activity Level - Current:   Stand with Assist     Needed?: No    Isolation: No  Infection: Not Applicable  Bariatric?: No    Vital Signs:   Vitals:    05/09/19 1000 05/09/19 1010 05/09/19 1020 05/09/19 1100   BP: 103/68 (!) 88/75 116/77 117/73   Pulse: 85  84 80   Resp:       Temp:       TempSrc:       SpO2:    100%       Cardiac Rhythm: ,        Pain level: 0-10 Pain Scale: 0    Is this patient confused?: Yes   Does this patient have a guardian?  No         If yes, is there guardianship documents in the Epic \"Code/ACP\" activity?  No         Guardian Notified?  N/A  [place] - Suicide Severity Rating Scale Completed?  No, secondary to AMS  If yes, what color did the patient score?  N/A    Patient Report: Initial Complaint: AMS  Focused Assessment:  48 year old male with a history of schizophrenia who presents to the ED from his group home for evaluation of altered mental status. The patient's friend reports that the patient has unsteady gait at baseline. But worse today Thus, a friend from his group home brought presented to the ED for evaluation. Here in the ED, the patient is unable to express why he is here. Staff a the group home does state that the patient takes his medications on his own, and last month accidentally took his pm meds in the am. He is on a high dose of clozapine for his night meds which may account for his sleepiness today. Pt is redirectable when awake. Pt became more awake while in ED seems like his baseline as described by . Pt temp recheck shows low grade fever 100.1.      Tests Performed: labs, ct  Abnormal Results:   Results for orders " placed or performed during the hospital encounter of 05/09/19   CT Head w/o Contrast    Narrative    CT SCAN OF THE HEAD WITHOUT CONTRAST   5/9/2019 8:38 AM     HISTORY: Schizophrenia and altered mental status.    TECHNIQUE:  Axial images of the head and coronal reformations without  IV contrast material. Radiation dose for this scan was reduced using  automated exposure control, adjustment of the mA and/or kV according  to patient size, or iterative reconstruction technique.    COMPARISON: None.    FINDINGS: There is no evidence of intracranial hemorrhage, mass, acute  infarct or anomaly. The ventricles are normal in size, shape and  configuration. The brain parenchyma and subarachnoid spaces are  normal.     The visualized portions of the sinuses and mastoids appear normal. The  bony calvarium and bones of the skull base appear intact.       Impression    IMPRESSION:   No evidence of acute intracranial hemorrhage, mass, or  herniation.     CIELO DESOUZA MD   XR Chest 2 Views    Narrative    CHEST TWO VIEWS May 9, 2019 8:46 AM     HISTORY: Altered mental status and elevated white blood cell count.    COMPARISON: None.    FINDINGS: Heart size and pulmonary vascularity are within normal  limits. The lungs are clear. No pneumothorax or pleural effusion.       Impression    IMPRESSION: No radiographic evidence of acute chest abnormality.     CHRIS RUBIN MD   CBC with platelets differential   Result Value Ref Range    WBC 20.3 (H) 4.0 - 11.0 10e9/L    RBC Count 4.20 (L) 4.4 - 5.9 10e12/L    Hemoglobin 12.5 (L) 13.3 - 17.7 g/dL    Hematocrit 36.0 (L) 40.0 - 53.0 %    MCV 86 78 - 100 fl    MCH 29.8 26.5 - 33.0 pg    MCHC 34.7 31.5 - 36.5 g/dL    RDW 13.6 10.0 - 15.0 %    Platelet Count 114 (L) 150 - 450 10e9/L    Diff Method Automated Method     % Neutrophils 85.0 %    % Lymphocytes 3.1 %    % Monocytes 11.5 %    % Eosinophils 0.0 %    % Basophils 0.0 %    % Immature Granulocytes 0.4 %    Nucleated RBCs 0 0 /100     Absolute Neutrophil 17.2 (H) 1.6 - 8.3 10e9/L    Absolute Lymphocytes 0.6 (L) 0.8 - 5.3 10e9/L    Absolute Monocytes 2.3 (H) 0.0 - 1.3 10e9/L    Absolute Eosinophils 0.0 0.0 - 0.7 10e9/L    Absolute Basophils 0.0 0.0 - 0.2 10e9/L    Abs Immature Granulocytes 0.1 0 - 0.4 10e9/L    Absolute Nucleated RBC 0.0    Comprehensive metabolic panel   Result Value Ref Range    Sodium 132 (L) 133 - 144 mmol/L    Potassium 3.3 (L) 3.4 - 5.3 mmol/L    Chloride 100 94 - 109 mmol/L    Carbon Dioxide 20 20 - 32 mmol/L    Anion Gap 12 3 - 14 mmol/L    Glucose 246 (H) 70 - 99 mg/dL    Urea Nitrogen 18 7 - 30 mg/dL    Creatinine 1.37 (H) 0.66 - 1.25 mg/dL    GFR Estimate 60 (L) >60 mL/min/[1.73_m2]    GFR Estimate If Black 70 >60 mL/min/[1.73_m2]    Calcium 9.1 8.5 - 10.1 mg/dL    Bilirubin Total 1.1 0.2 - 1.3 mg/dL    Albumin 3.2 (L) 3.4 - 5.0 g/dL    Protein Total 7.7 6.8 - 8.8 g/dL    Alkaline Phosphatase 140 40 - 150 U/L    ALT 18 0 - 70 U/L    AST 12 0 - 45 U/L   UA with Microscopic   Result Value Ref Range    Color Urine Yellow     Appearance Urine Clear     Glucose Urine Negative NEG^Negative mg/dL    Bilirubin Urine Negative NEG^Negative    Ketones Urine Negative NEG^Negative mg/dL    Specific Gravity Urine 1.006 1.003 - 1.035    Blood Urine Negative NEG^Negative    pH Urine 6.5 5.0 - 7.0 pH    Protein Albumin Urine 10 (A) NEG^Negative mg/dL    Urobilinogen mg/dL Normal 0.0 - 2.0 mg/dL    Nitrite Urine Negative NEG^Negative    Leukocyte Esterase Urine Negative NEG^Negative    Source Catheterized Urine     WBC Urine 1 0 - 5 /HPF    RBC Urine <1 0 - 2 /HPF    Bacteria Urine Few (A) NEG^Negative /HPF   Drug abuse screen 77 urine (FL, RH, SH)   Result Value Ref Range    Amphetamine Qual Urine Negative NEG^Negative    Barbiturates Qual Urine Negative NEG^Negative    Benzodiazepine Qual Urine Negative NEG^Negative    Cannabinoids Qual Urine Negative NEG^Negative    Cocaine Qual Urine Negative NEG^Negative    Opiates Qualitative Urine  Negative NEG^Negative    PCP Qual Urine Negative NEG^Negative   Alcohol ethyl   Result Value Ref Range    Ethanol g/dL <0.01 <0.01 g/dL   Salicylate level   Result Value Ref Range    Salicylate Level <2 mg/dL   Acetaminophen level   Result Value Ref Range    Acetaminophen Level <2 mg/L   Lactic acid   Result Value Ref Range    Lactic Acid 3.6 (H) 0.4 - 2.0 mmol/L   EKG 12-lead, tracing only   Result Value Ref Range    Interpretation ECG Click View Image link to view waveform and result        Treatments provided: fluids, abx    Family Comments: no family here    OBS brochure/video discussed/provided to patient/family: No              Name of person given brochure if not patient:               Relationship to patient:     ED Medications:   Medications   diazepam (VALIUM) injection 2.5 mg (has no administration in time range)   0.9% sodium chloride BOLUS (0 mLs Intravenous Stopped 5/9/19 0925)   0.9% sodium chloride BOLUS (1,000 mLs Intravenous New Bag 5/9/19 0925)   cefTRIAXone (ROCEPHIN) 2 g vial to attach to  ml bag for ADULTS or NS 50 ml bag for PEDS (2 g Intravenous New Bag 5/9/19 1034)       Drips infusing?:  No    For the majority of the shift this patient was Green.   Interventions performed were .    Severe Sepsis OR Septic Shock Diagnosis Present: No    To be done/followed up on inpatient unit:      ED NURSE PHONE NUMBER: [phone]

## 2019-05-09 NOTE — ED NOTES
Talked to staff from [facility]. They sts that pt is normally disorganized cognitively. They will try to send someone here. Staff sts pt has mixed up his day and night pills last April

## 2019-05-09 NOTE — ED NOTES
Pt got out of bad and is unstable on his feet. OBS charge notified and she is looking for a sitter

## 2019-05-09 NOTE — PROGRESS NOTES
Routine EEG  completed at patient's bedside. Procedure explained to patient prior to testing.   Ordered by Dr Hammonds

## 2019-05-09 NOTE — ED NOTES
I seen Pt up peeing in the sink. I went in PT had pulled out his iv. Blood all over floor and sink . And had the blood pressure cord wrapped around his neck.  I unwrapped the cord form his neck. Got pt back to bed and got the nurse  In to help.

## 2019-05-09 NOTE — ED PROVIDER NOTES
History     Chief Complaint:  Altered Mental Status    HPI   The patient's history was somewhat limited secondary to altered mental status. History was provided by the patient's friend at his Group Home.     Octavio Loya is a 48 year old male with a history of schizophrenia who presents to the ED from his group home for evaluation of altered mental status. The patient's friend reports that the patient has unsteady gait at baseline, though recently, staff has noted that the patient has had more balance issues than usual. He additionally is altered. Thus, a friend from his group home brought presented to the ED for evaluation. Here in the ED, the patient is unable to express why he is here. Staff a the group home does state that the patient takes his medications on his own, and has been on a high regimen of clozapine lately, which he could have accidentally taken too much of.    Allergies:  NKDA    Medications:    Clonazepam  Clozapine  Cogentin  Miralax  Keppra  Zantac  Levothyroxine  Propanolol  Sennosides    Past Medical History:    Schizophrenia  GERD  Hypothyroidism  Chronic constipation    Past Surgical History:    The patient does not have any pertinent past surgical history.    Family History:    No past pertinent family history.    Social History:  Marital Status:  Single [1]  Current Smokeless Tobacco user  Negative for alcohol use.  Negative for drug use.     Review of Systems   Unable to perform ROS: Mental status change       Physical Exam     Patient Vitals for the past 24 hrs:   BP Temp Temp src Pulse Heart Rate Resp SpO2   05/09/19 1310 (!) 99/91 100.1  F (37.8  C) -- -- -- -- --   05/09/19 1250 104/72 -- -- -- -- -- --   05/09/19 1230 106/67 -- -- 90 -- -- --   05/09/19 1220 (!) 115/98 -- -- -- -- -- --   05/09/19 1200 117/75 -- -- 96 -- -- --   05/09/19 1150 112/68 -- -- -- -- -- --   05/09/19 1130 109/67 -- -- 87 -- -- --   05/09/19 1120 (!) 105/93 -- -- -- -- -- --   05/09/19 1100 117/73 -- --  80 -- -- 100 %   05/09/19 1020 116/77 -- -- 84 -- -- --   05/09/19 1010 (!) 88/75 -- -- -- -- -- --   05/09/19 1000 103/68 -- -- 85 -- -- --   05/09/19 0950 112/69 -- -- -- -- -- --   05/09/19 0920 104/68 -- -- -- -- -- 98 %   05/09/19 0910 -- -- -- -- -- -- 98 %   05/09/19 0900 101/68 -- -- 82 -- -- 98 %   05/09/19 0850 100/69 -- -- -- -- -- 97 %   05/09/19 0820 98/64 -- -- -- -- -- 97 %   05/09/19 0810 97/65 -- -- 89 -- -- 99 %   05/09/19 0808 97/65 97.7  F (36.5  C) Oral 91 91 16 98 %     Physical Exam  Vitals: reviewed by me  General: Pt seen on [facility], but does appear to be confused.  Requires redirection frequently.  Minimal psychomotor agitation.  No rhythmic movements.  Eyes: Tracking well, clear conjunctiva BL  ENT: MMM, midline trachea.   Lungs:  No tachypnea, no accessory muscle use. No respiratory distress.   CV: Rate as above, regular rhythm.    Abd: Soft, non tender, no guarding, no rebound. Non distended  MSK: no peripheral edema or joint effusion.  No evidence of trauma  Skin: No rash, normal turgor and temperature  Neuro: Clear speech and no facial droop.  Moving all extremities spontaneously, does appear to be altered and that he would occasionally stand up and urinate in the sink.  Does speak clearly, often nonsensical  Psych: Not RIS, no e/o AH/VH      Emergency Department Course   ECG:  Indication: Altered Mental Status  Time: 0910  Vent. Rate 82 bpm. TN interval 142. QRS duration 96. QT/QTc 414/483. P-R-T axis 59 49 55.  Normal sinus rhythm. Prolonged QT. Abnormal ECG. Read time: 0915    Imaging:  CT Head without contrast:   No evidence of acute intracranial hemorrhage, mass, or herniation, as per radiology.    XR Chest 2 views:   No radiographic evidence of acute chest abnormality, as per radiology.     Laboratory:  CBC: WBC: 20.3 (H), HGB: 12.5 (L), PLT: 114 (L)  CMP: Glucose 246 (H),  (L), Potassium 3.3 (L), Creatinine 1.37 (H), GFR 60 (L), Albumin 3.2 (L), o/w  WNL  Blood cultures (X2): pending    0900 Lactic acid: 3.6 (H)  1220 Lactic acid: 1.2    Drug abuse screen: All negative  Salicylate: <2  Acetaminophen: <2  Alcohol ethyl: <0.01    Glucose CSF: 76 (H)  Protein total CSF: 51  Gram stain: no organisms seen  CSF culture aerobic bacterial: pending  Cell count with differential CSF: pending    UA with Microscopic: protein albumin 10 (A), bacteria few (A), o/w WNL    Procedures:   Lumbar Puncture       Indication:  Confusion, suspected meningitis     Consent:  Risks (including but not limited to; infection, bleeding, spinal headache with possibility of spinal patch and temporary or permanent neurologic injury), benefits and alternatives were discussed with group home staff and consent for procedure was obtained.     Timeout:  Universal protocol was followed. TIME OUT conducted just prior to starting procedure confirmed patient identity, site/side, procedure, patient position, and availability of correct equipment and implants? Yes      Medication:  Lidocaine, 3 cc     Procedure Note:  Patient was placed in a left lateral decubitus position.  The patient was medicated as above.  A spinal needle was used to gain access to the subarachnoid space with stylet in place. Opening pressure was grossly normal.  The fluid was grossly normal.  Stylet was replaced and needle withdrawn.     Patient Status:  Patient tolerated the procedure well.  There were no complications.    Interventions:  0818 NS 1L IV  0925 NS 1L IV  1034 Rocephin 2 g IV  Please see MAR for full list of medications administered in the ED.    Emergency Department Course:  Nursing notes and vitals reviewed. (0806) I performed an exam of the patient as documented above.     IV inserted. Medicine administered as documented above. Blood drawn. This was sent to the lab for further testing, results above.    The patient provided a urine sample here in the emergency department. This was sent for laboratory testing,  findings above.     The patient was sent for a Head CT and Chest XR while in the emergency department, findings above.     EKG obtained in the ED, see results above.     (1100) I consulted with Freda, Poison Control, regarding the patient's history and presentation here in the emergency department.    (1104) I rechecked the patient and discussed the results of his workup thus far.     (1135) I performed an LP, as per the procedure note above.    (1227)  I consulted with Dr. Hammonds of the hospitalist services. They are in agreement to accept the patient for admission.    Findings and plan explained to the caregiver who consents to admission. Discussed the patient with Dr. Hammonds, who will admit the patient to an observation bed for further monitoring, evaluation, and treatment.    Impression & Plan      Medical Decision Making:  Octavio Loya is a 48 year old male who presents to the emergency room with increased dizziness.  On my initial exam he was quite altered, which prompted a formal altered mental status work-up.  My differential was broad, and I believe the most likely cause of his altered mental status to be metabolic or septic encephalopathy.  He was started on broad-spectrum antibiotics shortly after arrival, and we are currently pending the white cell count on his lumbar puncture.  Late in this day, he did develop a fever, which does support this argument as well.  He has an elevated white count and lactate, and again, septic cares were undertaken immediately after these results.    I did also speak briefly with the poison center, as the group home offers that he may have taken too much of his clozapine today, and had access up to about 900 mg.  However he has no oral secretions, and no clear anticholinergic toxidrome.  We will continue treatment supportively, reassuring that his EKG and coingestion labs are normal.    His CT scan of the head is negative, and again I do suspect a septic cause for his  altered state and dizziness.  No focal neuro deficit, no evidence of an anatomical CNS lesion, will continue to monitor very carefully, and admit to the care of Dr. Hammonds who generously agreed to accept care of the patient.    Diagnosis:    ICD-10-CM    1. Sepsis, due to unspecified organism (H) A41.9 Drug abuse screen 77 urine (FL, RH, SH)     Blood culture     Blood culture     Glucose CSF:     Protein total CSF:     Gram stain     CSF Culture Aerobic Bacterial     Cell count with differential CSF:     Lactic acid whole blood     Disposition:  Admitted to Dr. Hammonds    Scribe Disclosure:  IMarce, am serving as a scribe on 5/9/2019 at 8:02 AM to personally document services performed by Octavio Mccord MD based on my observations and the provider's statements to me.     Marce Lee  5/9/2019    EMERGENCY DEPARTMENT       Octavio Mccord MD  05/11/19 2926

## 2019-05-09 NOTE — PHARMACY-ADMISSION MEDICATION HISTORY
Admission medication history interview status for the 5/9/2019  admission is complete. See EPIC admission navigator for prior to admission medications     Medication history source reliability:Good    Actions taken by pharmacist (provider contacted, etc): [facility] [phone] for medication history and last doses.     Additional medication history information not noted on PTA med list :None    Medication reconciliation/reorder completed by provider prior to medication history? No    Time spent in this activity: 15 minutes    Prior to Admission medications    Medication Sig Last Dose Taking? Auth Provider   busPIRone HCl (BUSPAR) 30 MG tablet Take 30 mg by mouth 2 times daily 5/9/2019 at am Yes Reported, Patient   clonazePAM (KLONOPIN) 1 MG tablet Take 0.5 mg by mouth At Bedtime  5/8/2019 at pm Yes Reported, Patient   cloZAPine (CLOZARIL) 100 MG tablet Take 300 mg by mouth every evening At 5 pm 5/8/2019 at 5pm Yes Unknown, Entered By History   cloZAPine (CLOZARIL) 100 MG tablet Take 500 mg by mouth At Bedtime  5/8/2019 at 2200 Yes Reported, Patient   levETIRAcetam (KEPPRA) 500 MG tablet Take 500 mg by mouth 2 times daily  5/9/2019 at am Yes Reported, Patient   multivitamin, therapeutic (THERA-VIT) TABS tablet Take 1 tablet by mouth daily 5/9/2019 at am Yes Unknown, Entered By History   propranolol (INDERAL) 40 MG tablet Take 40 mg by mouth 5/9/2019 at am Yes Reported, Patient   ranitidine (ZANTAC) 150 MG tablet Take 150 mg by mouth 2 times daily 5/9/2019 at am Yes Unknown, Entered By History   sennosides (SENOKOT) 8.6 MG tablet Take 1 tablet by mouth 2 times daily as needed for constipation  at prn Yes Unknown, Entered By History   sertraline (ZOLOFT) 50 MG tablet Take 50 mg by mouth daily 5/9/2019 at am Yes Reported, Patient       Margaret Sawyer, PharmD   [phone]

## 2019-05-09 NOTE — H&P
[facility]    History and Physical - Hospitalist Service       Date of Admission:  5/9/2019    Assessment & Plan   Octavio Loya is a 48 year-old male admitted on 5/9/2019.      Acute encephalopathy, unclear type  History from patient currently unreliable, initial history was provided by his friend who is no longer available.  His friend reported that while waiting for the bus for work this morning that the patient seemed more confused and had unsteadiness that was more than usual, and so his friend brought him in for further evaluation.  Patient initially reported taking additional pills that were not his, though later denied this. He reportedly takes his medication on his own at the group home (not staffed full time) and has a history of taking medications incorrectly in the past. Patient denies any history of seizure. Afebrile though white blood cell count and lactic acid were elevated. Preliminary infectious work-up is negative with normal chest x-ray, normal UA, LP with 4 WBC.  He has nonetheless not had any witnessed seizure activity reported.  - Chart reviewed and discussed history in detail with .  Patient is on levetiracetam per [facility] medication list.  He had previously been on Depakote which was stopped due to concern that this was contributing to his mental status and balance changes during an admission at [facility] in 2017.  It appears in records available to his  that levetiracetam was added in anticipation of higher doses of clozapine being used.  - EEG, neurology consult if abnormal or witnessed seizure activity   - Hold levetiracetam for now, will have psychiatry consulted as below to help determine if this medication is needed based on his clozapine dosing  - Monitor blood cultures  - Will be covered for 24 hours with ceftriaxone IV given in ED, hold on additional antibiotics for now pending additional data  - Supportive cares   - Re-orient as needed  -  Maintain normal day/night, sleep wake cycles  - Minimize sedating/altering medications as able  - Treat separate conditions as detailed above/below    Schizophrenia  Anxiety  - Hold prior to admission regimen for now  - Psychiatry consulted to review medications and re-initiate when appropriate     Elevated lactic acid   - Recheck resolved     Elevated creatinine  Mild hyponatremia  Creatinine 1.37 on admission. [facility].   - Monitor BMP   - Normal saline IVF    Mild hypokalemia   - Potassium replacement protocol     Hyperglycemia  - Check hemoglobin A1c in AM       Diet: Clear liquid diet, advance as tolerated as mental status improves   DVT Prophylaxis: Ambulate every shift  Schulz Catheter: not present  Code Status: Full code    Disposition Plan   Expected discharge: [place] to observation. Anticipate discharge within 24 hours if clinically improved.   Entered: Ravi Hammonds MD 05/09/2019, 12:41 PM     The patient's care was discussed with the Bedside Nurse, Patient and Patient's     Ravi Hammonds MD  [facility]    ______________________________________________________________________    Chief Complaint   Confusion, balance changes     History of Present Illness   Octavio Loya is a 48 year old male who presents with the above chief complaint.    History is obtained from limited discussion with patient and discussion with Emergency Department provider who talked with his friend who brought the patient in.  The patient apparently was waiting for his bus for work and seemed to be having more balance difficulty than is usual for him.  He also seemed to be confused which led to his friend bringing him in.  Patient lives in a group home although reportedly this is not staffed for the entire day and patient's are able to take their own medications.  The patient has a history of taking his medications incorrectly and there has been  "some question if that happened today though unable to confirm.  At present it is difficult to determine any active symptoms present as his story changes depending on when asked, for instance he initially referred to extra pills of a \"Loren\", but then when asked a short while later denied taking any extra medications.    In the Emergency Department, the patient was seen by Dr. Sam Mccord, with whom I discussed the patient's presenting symptoms and emergency department course.  Initial vital signs were a temperature of 97.7F, HR 91, BP 97/65, RR 98% on room air. Work-up included negative head CT, negative UA, negative CXR. LP was performed which showed 4 WBC, 9 RBC.  He was given a dose of ceftriaxone and acyclovir awaiting LP results and hospitalists were contacted for admission to the hospital.     Review of Systems    Complete 10 point review of systems assessed and negative except as noted in HPI.    Past Medical History    I have reviewed this patient's medical history and updated it with pertinent information if needed.   Past Medical History:   Diagnosis Date     Anxiety      Schizophrenia (H)      Wrist pain, right        Past Surgical History   I have reviewed this patient's surgical history and anxupdated it with pertinent information if needed.  No past surgical history on file.    Social History   Unable to review with patient, see below     Social History     Tobacco Use     Smoking status: Never Smoker     Smokeless tobacco: Current User   Substance Use Topics     Alcohol use: No     Drug use: No     Lives in group home.    Family History   Depression in brother    Prior to Admission Medications   Prior to Admission Medications   Prescriptions Last Dose Informant Patient Reported? Taking?   busPIRone HCl (BUSPAR) 30 MG tablet 5/9/2019 at am  Yes Yes   Sig: Take 30 mg by mouth 2 times daily   cloZAPine (CLOZARIL) 100 MG tablet 5/8/2019 at 2200  Yes Yes   Sig: Take 500 mg by mouth At Bedtime  "   cloZAPine (CLOZARIL) 100 MG tablet 5/8/2019 at 5pm  Yes Yes   Sig: Take 300 mg by mouth every evening At 5 pm   clonazePAM (KLONOPIN) 1 MG tablet 5/8/2019 at pm  Yes Yes   Sig: Take 0.5 mg by mouth At Bedtime    levETIRAcetam (KEPPRA) 500 MG tablet 5/9/2019 at am  Yes Yes   Sig: Take 500 mg by mouth 2 times daily    multivitamin, therapeutic (THERA-VIT) TABS tablet 5/9/2019 at am  Yes Yes   Sig: Take 1 tablet by mouth daily   propranolol (INDERAL) 40 MG tablet 5/9/2019 at am  Yes Yes   Sig: Take 40 mg by mouth   ranitidine (ZANTAC) 150 MG tablet 5/9/2019 at am  Yes Yes   Sig: Take 150 mg by mouth 2 times daily   sennosides (SENOKOT) 8.6 MG tablet  at prn  Yes Yes   Sig: Take 1 tablet by mouth 2 times daily as needed for constipation   sertraline (ZOLOFT) 50 MG tablet 5/9/2019 at am  Yes Yes   Sig: Take 50 mg by mouth daily      Facility-Administered Medications: None     Allergies   Allergies   Allergen Reactions     Depakote [Valproic Acid]        Physical Exam   Vital Signs: Temp: 97.7  F (36.5  C) Temp src: Oral BP: 117/75 Pulse: 96 Heart Rate: 91 Resp: 16 SpO2: 100 %      Weight: 0 lbs 0 oz    Constitutional: NAD  Eyes: PERRL, EOMI  HENT: Oropharynx clear, MMM  Respiratory: Clear to auscultation bilaterally, good air movement, normal effort   Cardiovascular: RRR, no m/r/g. No peripheral edema.   bilaterally.  GI: Soft, non-tender, non-distended. No rebound tenderness or guarding.    Skin: Warm, dry   Neurologic: Alert. Nonsensical speech. Moving all extremities equally and on command.  Strength grossly intact and symmetric bilaterally.   Psychiatric: Tangential, difficult to obtain any reliable history, otherwise is cooperative      Data   Data reviewed today: I reviewed all medications, new labs and imaging results over the last 24 hours. I personally reviewed the EKG tracing showing sinus rhythm, no ischemic ST-T wave changes.    Recent Labs   Lab 05/09/19  0815   WBC 20.3*   HGB 12.5*   MCV 86   *    *   POTASSIUM 3.3*   CHLORIDE 100   CO2 20   BUN 18   CR 1.37*   ANIONGAP 12   VIJAY 9.1   *   ALBUMIN 3.2*   PROTTOTAL 7.7   BILITOTAL 1.1   ALKPHOS 140   ALT 18   AST 12       Recent Results (from the past 24 hour(s))   CT Head w/o Contrast    Narrative    CT SCAN OF THE HEAD WITHOUT CONTRAST   5/9/2019 8:38 AM     HISTORY: Schizophrenia and altered mental status.    TECHNIQUE:  Axial images of the head and coronal reformations without  IV contrast material. Radiation dose for this scan was reduced using  automated exposure control, adjustment of the mA and/or kV according  to patient size, or iterative reconstruction technique.    COMPARISON: None.    FINDINGS: There is no evidence of intracranial hemorrhage, mass, acute  infarct or anomaly. The ventricles are normal in size, shape and  configuration. The brain parenchyma and subarachnoid spaces are  normal.     The visualized portions of the sinuses and mastoids appear normal. The  bony calvarium and bones of the skull base appear intact.       Impression    IMPRESSION:   No evidence of acute intracranial hemorrhage, mass, or  herniation.     CIELO DESOUZA MD   XR Chest 2 Views    Narrative    CHEST TWO VIEWS May 9, 2019 8:46 AM     HISTORY: Altered mental status and elevated white blood cell count.    COMPARISON: None.    FINDINGS: Heart size and pulmonary vascularity are within normal  limits. The lungs are clear. No pneumothorax or pleural effusion.       Impression    IMPRESSION: No radiographic evidence of acute chest abnormality.     CHRIS RUBIN MD

## 2019-05-09 NOTE — ED NOTES
Bed: ED26  Expected date:   Expected time:   Means of arrival:   Comments:  Triage- balance problems

## 2019-05-10 ENCOUNTER — ANESTHESIA (OUTPATIENT)
Dept: SURGERY | Facility: CLINIC | Age: 49
End: 2019-05-10
Payer: COMMERCIAL

## 2019-05-10 ENCOUNTER — ANESTHESIA EVENT (OUTPATIENT)
Dept: SURGERY | Facility: CLINIC | Age: 49
End: 2019-05-10
Payer: COMMERCIAL

## 2019-05-10 ENCOUNTER — APPOINTMENT (OUTPATIENT)
Dept: CT IMAGING | Facility: CLINIC | Age: 49
End: 2019-05-10
Attending: INTERNAL MEDICINE
Payer: COMMERCIAL

## 2019-05-10 PROBLEM — R78.81 E COLI BACTEREMIA: Status: ACTIVE | Noted: 2019-05-10

## 2019-05-10 PROBLEM — B96.20 E COLI BACTEREMIA: Status: ACTIVE | Noted: 2019-05-10

## 2019-05-10 LAB
ABO + RH BLD: NORMAL
ABO + RH BLD: NORMAL
ANION GAP SERPL CALCULATED.3IONS-SCNC: 6 MMOL/L (ref 3–14)
APTT PPP: 51 SEC (ref 22–37)
BLD GP AB SCN SERPL QL: NORMAL
BLD PROD TYP BPU: NORMAL
BLOOD BANK CMNT PATIENT-IMP: NORMAL
BUN SERPL-MCNC: 10 MG/DL (ref 7–30)
BUN SERPL-MCNC: 14 MG/DL (ref 7–30)
CALCIUM SERPL-MCNC: 7.8 MG/DL (ref 8.5–10.1)
CALCIUM SERPL-MCNC: 8.2 MG/DL (ref 8.5–10.1)
CHLORIDE SERPL-SCNC: 112 MMOL/L (ref 94–109)
CHLORIDE SERPL-SCNC: 113 MMOL/L (ref 94–109)
CO2 SERPL-SCNC: 21 MMOL/L (ref 20–32)
CO2 SERPL-SCNC: 23 MMOL/L (ref 20–32)
CREAT SERPL-MCNC: 0.87 MG/DL (ref 0.66–1.25)
CREAT SERPL-MCNC: 1.05 MG/DL (ref 0.66–1.25)
ERYTHROCYTE [DISTWIDTH] IN BLOOD BY AUTOMATED COUNT: 14 % (ref 10–15)
ERYTHROCYTE [DISTWIDTH] IN BLOOD BY AUTOMATED COUNT: 14.1 % (ref 10–15)
GFR SERPL CREATININE-BSD FRML MDRD: 83 ML/MIN/{1.73_M2}
GFR SERPL CREATININE-BSD FRML MDRD: >90 ML/MIN/{1.73_M2}
GLUCOSE BLDC GLUCOMTR-MCNC: 107 MG/DL (ref 70–99)
GLUCOSE BLDC GLUCOMTR-MCNC: 95 MG/DL (ref 70–99)
GLUCOSE SERPL-MCNC: 92 MG/DL (ref 70–99)
GLUCOSE SERPL-MCNC: 99 MG/DL (ref 70–99)
GRAM STN SPEC: ABNORMAL
HBA1C MFR BLD: 4.9 % (ref 0–5.6)
HCT VFR BLD AUTO: 26.5 % (ref 40–53)
HCT VFR BLD AUTO: 32.2 % (ref 40–53)
HGB BLD-MCNC: 11 G/DL (ref 13.3–17.7)
HGB BLD-MCNC: 9.1 G/DL (ref 13.3–17.7)
HSV1 DNA CSF QL NAA+PROBE: NOT DETECTED
HSV2 DNA CSF QL NAA+PROBE: NOT DETECTED
INR PPP: 1.36 (ref 0.86–1.14)
LACTATE BLD-SCNC: 0.6 MMOL/L (ref 0.7–2)
Lab: ABNORMAL
MCH RBC QN AUTO: 29.6 PG (ref 26.5–33)
MCH RBC QN AUTO: 29.7 PG (ref 26.5–33)
MCHC RBC AUTO-ENTMCNC: 34.2 G/DL (ref 31.5–36.5)
MCHC RBC AUTO-ENTMCNC: 34.3 G/DL (ref 31.5–36.5)
MCV RBC AUTO: 86 FL (ref 78–100)
MCV RBC AUTO: 87 FL (ref 78–100)
MICROBIOLOGIST REVIEW: NORMAL
NUM BPU REQUESTED: 2
PLATELET # BLD AUTO: 77 10E9/L (ref 150–450)
PLATELET # BLD AUTO: 98 10E9/L (ref 150–450)
POTASSIUM SERPL-SCNC: 3.7 MMOL/L (ref 3.4–5.3)
POTASSIUM SERPL-SCNC: 3.8 MMOL/L (ref 3.4–5.3)
RBC # BLD AUTO: 3.07 10E12/L (ref 4.4–5.9)
RBC # BLD AUTO: 3.7 10E12/L (ref 4.4–5.9)
SODIUM SERPL-SCNC: 141 MMOL/L (ref 133–144)
SODIUM SERPL-SCNC: 144 MMOL/L (ref 133–144)
SPECIMEN EXP DATE BLD: NORMAL
SPECIMEN SOURCE: ABNORMAL
WBC # BLD AUTO: 7.7 10E9/L (ref 4–11)
WBC # BLD AUTO: 8.8 10E9/L (ref 4–11)

## 2019-05-10 PROCEDURE — 83036 HEMOGLOBIN GLYCOSYLATED A1C: CPT | Performed by: INTERNAL MEDICINE

## 2019-05-10 PROCEDURE — 84132 ASSAY OF SERUM POTASSIUM: CPT

## 2019-05-10 PROCEDURE — 25800030 ZZH RX IP 258 OP 636: Performed by: INTERNAL MEDICINE

## 2019-05-10 PROCEDURE — 25000125 ZZHC RX 250: Performed by: PHYSICIAN ASSISTANT

## 2019-05-10 PROCEDURE — 85027 COMPLETE CBC AUTOMATED: CPT | Performed by: INTERNAL MEDICINE

## 2019-05-10 PROCEDURE — G0378 HOSPITAL OBSERVATION PER HR: HCPCS

## 2019-05-10 PROCEDURE — 25000128 H RX IP 250 OP 636: Performed by: NURSE ANESTHETIST, CERTIFIED REGISTERED

## 2019-05-10 PROCEDURE — 99203 OFFICE O/P NEW LOW 30 MIN: CPT | Mod: 57 | Performed by: SURGERY

## 2019-05-10 PROCEDURE — 0DNW4ZZ RELEASE PERITONEUM, PERCUTANEOUS ENDOSCOPIC APPROACH: ICD-10-PCS | Performed by: SURGERY

## 2019-05-10 PROCEDURE — 84295 ASSAY OF SERUM SODIUM: CPT

## 2019-05-10 PROCEDURE — 85730 THROMBOPLASTIN TIME PARTIAL: CPT | Performed by: INTERNAL MEDICINE

## 2019-05-10 PROCEDURE — 36415 COLL VENOUS BLD VENIPUNCTURE: CPT | Performed by: INTERNAL MEDICINE

## 2019-05-10 PROCEDURE — 25000125 ZZHC RX 250: Performed by: NURSE ANESTHETIST, CERTIFIED REGISTERED

## 2019-05-10 PROCEDURE — 25800030 ZZH RX IP 258 OP 636: Performed by: NURSE ANESTHETIST, CERTIFIED REGISTERED

## 2019-05-10 PROCEDURE — 74177 CT ABD & PELVIS W/CONTRAST: CPT

## 2019-05-10 PROCEDURE — 36000058 ZZH SURGERY LEVEL 3 EA 15 ADDTL MIN: Performed by: SURGERY

## 2019-05-10 PROCEDURE — 85610 PROTHROMBIN TIME: CPT | Performed by: INTERNAL MEDICINE

## 2019-05-10 PROCEDURE — 88304 TISSUE EXAM BY PATHOLOGIST: CPT | Performed by: SURGERY

## 2019-05-10 PROCEDURE — 36000056 ZZH SURGERY LEVEL 3 1ST 30 MIN: Performed by: SURGERY

## 2019-05-10 PROCEDURE — 71000014 ZZH RECOVERY PHASE 1 LEVEL 2 FIRST HR: Performed by: SURGERY

## 2019-05-10 PROCEDURE — 25000125 ZZHC RX 250: Performed by: INTERNAL MEDICINE

## 2019-05-10 PROCEDURE — 25000125 ZZHC RX 250: Performed by: SURGERY

## 2019-05-10 PROCEDURE — 25000566 ZZH SEVOFLURANE, EA 15 MIN: Performed by: SURGERY

## 2019-05-10 PROCEDURE — 82803 BLOOD GASES ANY COMBINATION: CPT

## 2019-05-10 PROCEDURE — 37000008 ZZH ANESTHESIA TECHNICAL FEE, 1ST 30 MIN: Performed by: SURGERY

## 2019-05-10 PROCEDURE — 25000128 H RX IP 250 OP 636: Performed by: ANESTHESIOLOGY

## 2019-05-10 PROCEDURE — 3E033XZ INTRODUCTION OF VASOPRESSOR INTO PERIPHERAL VEIN, PERCUTANEOUS APPROACH: ICD-10-PCS | Performed by: SURGERY

## 2019-05-10 PROCEDURE — 85014 HEMATOCRIT: CPT

## 2019-05-10 PROCEDURE — 40000916 ZZH STATISTIC SITTER, NIGHT HOURS

## 2019-05-10 PROCEDURE — 25000128 H RX IP 250 OP 636: Performed by: INTERNAL MEDICINE

## 2019-05-10 PROCEDURE — 86923 COMPATIBILITY TEST ELECTRIC: CPT | Performed by: INTERNAL MEDICINE

## 2019-05-10 PROCEDURE — 47562 LAPAROSCOPIC CHOLECYSTECTOMY: CPT | Mod: AS | Performed by: PHYSICIAN ASSISTANT

## 2019-05-10 PROCEDURE — 40000169 ZZH STATISTIC PRE-PROCEDURE ASSESSMENT I: Performed by: SURGERY

## 2019-05-10 PROCEDURE — 88304 TISSUE EXAM BY PATHOLOGIST: CPT | Mod: 26 | Performed by: SURGERY

## 2019-05-10 PROCEDURE — 86900 BLOOD TYPING SEROLOGIC ABO: CPT | Performed by: INTERNAL MEDICINE

## 2019-05-10 PROCEDURE — 27210794 ZZH OR GENERAL SUPPLY STERILE: Performed by: SURGERY

## 2019-05-10 PROCEDURE — 0FT44ZZ RESECTION OF GALLBLADDER, PERCUTANEOUS ENDOSCOPIC APPROACH: ICD-10-PCS | Performed by: SURGERY

## 2019-05-10 PROCEDURE — 25000128 H RX IP 250 OP 636: Performed by: PHYSICIAN ASSISTANT

## 2019-05-10 PROCEDURE — 87075 CULTR BACTERIA EXCEPT BLOOD: CPT | Performed by: SURGERY

## 2019-05-10 PROCEDURE — 99221 1ST HOSP IP/OBS SF/LOW 40: CPT | Performed by: PSYCHIATRY & NEUROLOGY

## 2019-05-10 PROCEDURE — 47562 LAPAROSCOPIC CHOLECYSTECTOMY: CPT | Performed by: SURGERY

## 2019-05-10 PROCEDURE — 25000132 ZZH RX MED GY IP 250 OP 250 PS 637: Performed by: INTERNAL MEDICINE

## 2019-05-10 PROCEDURE — 87077 CULTURE AEROBIC IDENTIFY: CPT | Performed by: SURGERY

## 2019-05-10 PROCEDURE — P9041 ALBUMIN (HUMAN),5%, 50ML: HCPCS | Performed by: NURSE ANESTHETIST, CERTIFIED REGISTERED

## 2019-05-10 PROCEDURE — 80048 BASIC METABOLIC PNL TOTAL CA: CPT | Performed by: INTERNAL MEDICINE

## 2019-05-10 PROCEDURE — 71000015 ZZH RECOVERY PHASE 1 LEVEL 2 EA ADDTL HR: Performed by: SURGERY

## 2019-05-10 PROCEDURE — 87186 SC STD MICRODIL/AGAR DIL: CPT | Performed by: SURGERY

## 2019-05-10 PROCEDURE — 87205 SMEAR GRAM STAIN: CPT | Performed by: SURGERY

## 2019-05-10 PROCEDURE — 83605 ASSAY OF LACTIC ACID: CPT | Performed by: INTERNAL MEDICINE

## 2019-05-10 PROCEDURE — 20000003 ZZH R&B ICU

## 2019-05-10 PROCEDURE — 86901 BLOOD TYPING SEROLOGIC RH(D): CPT | Performed by: INTERNAL MEDICINE

## 2019-05-10 PROCEDURE — 87070 CULTURE OTHR SPECIMN AEROBIC: CPT | Performed by: SURGERY

## 2019-05-10 PROCEDURE — 37000009 ZZH ANESTHESIA TECHNICAL FEE, EACH ADDTL 15 MIN: Performed by: SURGERY

## 2019-05-10 PROCEDURE — 99233 SBSQ HOSP IP/OBS HIGH 50: CPT | Performed by: INTERNAL MEDICINE

## 2019-05-10 PROCEDURE — 00000146 ZZHCL STATISTIC GLUCOSE BY METER IP

## 2019-05-10 PROCEDURE — 86850 RBC ANTIBODY SCREEN: CPT | Performed by: INTERNAL MEDICINE

## 2019-05-10 RX ORDER — DIAZEPAM 10 MG/2ML
5 INJECTION, SOLUTION INTRAMUSCULAR; INTRAVENOUS EVERY 4 HOURS PRN
Status: DISCONTINUED | OUTPATIENT
Start: 2019-05-10 | End: 2019-05-15 | Stop reason: HOSPADM

## 2019-05-10 RX ORDER — SODIUM CHLORIDE, SODIUM LACTATE, POTASSIUM CHLORIDE, CALCIUM CHLORIDE 600; 310; 30; 20 MG/100ML; MG/100ML; MG/100ML; MG/100ML
INJECTION, SOLUTION INTRAVENOUS CONTINUOUS
Status: DISCONTINUED | OUTPATIENT
Start: 2019-05-10 | End: 2019-05-10 | Stop reason: HOSPADM

## 2019-05-10 RX ORDER — LIDOCAINE HYDROCHLORIDE 20 MG/ML
INJECTION, SOLUTION INFILTRATION; PERINEURAL PRN
Status: DISCONTINUED | OUTPATIENT
Start: 2019-05-10 | End: 2019-05-10

## 2019-05-10 RX ORDER — IOPAMIDOL 755 MG/ML
73 INJECTION, SOLUTION INTRAVASCULAR ONCE
Status: COMPLETED | OUTPATIENT
Start: 2019-05-10 | End: 2019-05-10

## 2019-05-10 RX ORDER — ALBUMIN, HUMAN INJ 5% 5 %
SOLUTION INTRAVENOUS CONTINUOUS PRN
Status: DISCONTINUED | OUTPATIENT
Start: 2019-05-10 | End: 2019-05-10

## 2019-05-10 RX ORDER — HYDROMORPHONE HYDROCHLORIDE 1 MG/ML
.3-.5 INJECTION, SOLUTION INTRAMUSCULAR; INTRAVENOUS; SUBCUTANEOUS EVERY 5 MIN PRN
Status: DISCONTINUED | OUTPATIENT
Start: 2019-05-10 | End: 2019-05-10 | Stop reason: HOSPADM

## 2019-05-10 RX ORDER — MEPERIDINE HYDROCHLORIDE 25 MG/ML
12.5 INJECTION INTRAMUSCULAR; INTRAVENOUS; SUBCUTANEOUS EVERY 5 MIN PRN
Status: DISCONTINUED | OUTPATIENT
Start: 2019-05-10 | End: 2019-05-10 | Stop reason: HOSPADM

## 2019-05-10 RX ORDER — ONDANSETRON 2 MG/ML
INJECTION INTRAMUSCULAR; INTRAVENOUS PRN
Status: DISCONTINUED | OUTPATIENT
Start: 2019-05-10 | End: 2019-05-10

## 2019-05-10 RX ORDER — HYDROMORPHONE HYDROCHLORIDE 1 MG/ML
.3-.5 INJECTION, SOLUTION INTRAMUSCULAR; INTRAVENOUS; SUBCUTANEOUS
Status: DISCONTINUED | OUTPATIENT
Start: 2019-05-10 | End: 2019-05-15 | Stop reason: HOSPADM

## 2019-05-10 RX ORDER — PIPERACILLIN SODIUM, TAZOBACTAM SODIUM 3; .375 G/15ML; G/15ML
3.38 INJECTION, POWDER, LYOPHILIZED, FOR SOLUTION INTRAVENOUS EVERY 6 HOURS
Status: DISCONTINUED | OUTPATIENT
Start: 2019-05-10 | End: 2019-05-15 | Stop reason: HOSPADM

## 2019-05-10 RX ORDER — GLYCOPYRROLATE 0.2 MG/ML
INJECTION, SOLUTION INTRAMUSCULAR; INTRAVENOUS PRN
Status: DISCONTINUED | OUTPATIENT
Start: 2019-05-10 | End: 2019-05-10

## 2019-05-10 RX ORDER — CEFTRIAXONE 2 G/1
2 INJECTION, POWDER, FOR SOLUTION INTRAMUSCULAR; INTRAVENOUS EVERY 24 HOURS
Status: DISCONTINUED | OUTPATIENT
Start: 2019-05-10 | End: 2019-05-10

## 2019-05-10 RX ORDER — PIPERACILLIN SODIUM, TAZOBACTAM SODIUM 3; .375 G/15ML; G/15ML
INJECTION, POWDER, LYOPHILIZED, FOR SOLUTION INTRAVENOUS PRN
Status: DISCONTINUED | OUTPATIENT
Start: 2019-05-10 | End: 2019-05-10

## 2019-05-10 RX ORDER — NEOSTIGMINE METHYLSULFATE 1 MG/ML
VIAL (ML) INJECTION PRN
Status: DISCONTINUED | OUTPATIENT
Start: 2019-05-10 | End: 2019-05-10

## 2019-05-10 RX ORDER — CEFTRIAXONE 1 G/1
1 INJECTION, POWDER, FOR SOLUTION INTRAMUSCULAR; INTRAVENOUS EVERY 12 HOURS
Status: DISCONTINUED | OUTPATIENT
Start: 2019-05-10 | End: 2019-05-10

## 2019-05-10 RX ORDER — FENTANYL CITRATE 50 UG/ML
INJECTION, SOLUTION INTRAMUSCULAR; INTRAVENOUS PRN
Status: DISCONTINUED | OUTPATIENT
Start: 2019-05-10 | End: 2019-05-10

## 2019-05-10 RX ORDER — SODIUM CHLORIDE, SODIUM LACTATE, POTASSIUM CHLORIDE, CALCIUM CHLORIDE 600; 310; 30; 20 MG/100ML; MG/100ML; MG/100ML; MG/100ML
INJECTION, SOLUTION INTRAVENOUS CONTINUOUS
Status: DISCONTINUED | OUTPATIENT
Start: 2019-05-10 | End: 2019-05-11

## 2019-05-10 RX ORDER — SODIUM CHLORIDE, SODIUM LACTATE, POTASSIUM CHLORIDE, CALCIUM CHLORIDE 600; 310; 30; 20 MG/100ML; MG/100ML; MG/100ML; MG/100ML
INJECTION, SOLUTION INTRAVENOUS CONTINUOUS PRN
Status: DISCONTINUED | OUTPATIENT
Start: 2019-05-10 | End: 2019-05-10

## 2019-05-10 RX ORDER — FENTANYL CITRATE 50 UG/ML
25-50 INJECTION, SOLUTION INTRAMUSCULAR; INTRAVENOUS
Status: DISCONTINUED | OUTPATIENT
Start: 2019-05-10 | End: 2019-05-10 | Stop reason: HOSPADM

## 2019-05-10 RX ORDER — ONDANSETRON 2 MG/ML
4 INJECTION INTRAMUSCULAR; INTRAVENOUS EVERY 30 MIN PRN
Status: DISCONTINUED | OUTPATIENT
Start: 2019-05-10 | End: 2019-05-10 | Stop reason: HOSPADM

## 2019-05-10 RX ORDER — MAGNESIUM HYDROXIDE 1200 MG/15ML
LIQUID ORAL PRN
Status: DISCONTINUED | OUTPATIENT
Start: 2019-05-10 | End: 2019-05-10 | Stop reason: HOSPADM

## 2019-05-10 RX ORDER — ONDANSETRON 4 MG/1
4 TABLET, ORALLY DISINTEGRATING ORAL EVERY 30 MIN PRN
Status: DISCONTINUED | OUTPATIENT
Start: 2019-05-10 | End: 2019-05-10 | Stop reason: HOSPADM

## 2019-05-10 RX ORDER — PROPOFOL 10 MG/ML
INJECTION, EMULSION INTRAVENOUS PRN
Status: DISCONTINUED | OUTPATIENT
Start: 2019-05-10 | End: 2019-05-10

## 2019-05-10 RX ORDER — NALOXONE HYDROCHLORIDE 0.4 MG/ML
.1-.4 INJECTION, SOLUTION INTRAMUSCULAR; INTRAVENOUS; SUBCUTANEOUS
Status: DISCONTINUED | OUTPATIENT
Start: 2019-05-10 | End: 2019-05-15 | Stop reason: HOSPADM

## 2019-05-10 RX ORDER — NALOXONE HYDROCHLORIDE 0.4 MG/ML
.1-.4 INJECTION, SOLUTION INTRAMUSCULAR; INTRAVENOUS; SUBCUTANEOUS
Status: DISCONTINUED | OUTPATIENT
Start: 2019-05-10 | End: 2019-05-10

## 2019-05-10 RX ADMIN — PHENYLEPHRINE HYDROCHLORIDE 0.3 MCG/KG/MIN: 10 INJECTION, SOLUTION INTRAMUSCULAR; INTRAVENOUS; SUBCUTANEOUS at 16:48

## 2019-05-10 RX ADMIN — ONDANSETRON 4 MG: 2 INJECTION INTRAMUSCULAR; INTRAVENOUS at 16:52

## 2019-05-10 RX ADMIN — IOPAMIDOL 73 ML: 755 INJECTION, SOLUTION INTRAVENOUS at 10:47

## 2019-05-10 RX ADMIN — GLYCOPYRROLATE 0.4 MG: 0.2 INJECTION, SOLUTION INTRAMUSCULAR; INTRAVENOUS at 18:06

## 2019-05-10 RX ADMIN — SODIUM CHLORIDE 62 ML: 9 INJECTION, SOLUTION INTRAVENOUS at 10:48

## 2019-05-10 RX ADMIN — RANITIDINE 150 MG: 150 TABLET ORAL at 07:58

## 2019-05-10 RX ADMIN — ALBUMIN HUMAN: 0.05 INJECTION, SOLUTION INTRAVENOUS at 16:10

## 2019-05-10 RX ADMIN — ROCURONIUM BROMIDE 40 MG: 10 INJECTION INTRAVENOUS at 16:34

## 2019-05-10 RX ADMIN — SODIUM CHLORIDE, POTASSIUM CHLORIDE, SODIUM LACTATE AND CALCIUM CHLORIDE: 600; 310; 30; 20 INJECTION, SOLUTION INTRAVENOUS at 10:28

## 2019-05-10 RX ADMIN — LIDOCAINE HYDROCHLORIDE 40 MG: 20 INJECTION, SOLUTION INFILTRATION; PERINEURAL at 16:13

## 2019-05-10 RX ADMIN — PHENYLEPHRINE HYDROCHLORIDE 100 MCG: 10 INJECTION, SOLUTION INTRAMUSCULAR; INTRAVENOUS; SUBCUTANEOUS at 17:36

## 2019-05-10 RX ADMIN — CEFTRIAXONE SODIUM 2 G: 2 INJECTION, POWDER, FOR SOLUTION INTRAMUSCULAR; INTRAVENOUS at 09:13

## 2019-05-10 RX ADMIN — PROPOFOL 70 MG: 10 INJECTION, EMULSION INTRAVENOUS at 16:13

## 2019-05-10 RX ADMIN — ALBUMIN HUMAN: 0.05 INJECTION, SOLUTION INTRAVENOUS at 15:58

## 2019-05-10 RX ADMIN — SODIUM CHLORIDE, POTASSIUM CHLORIDE, SODIUM LACTATE AND CALCIUM CHLORIDE: 600; 310; 30; 20 INJECTION, SOLUTION INTRAVENOUS at 15:51

## 2019-05-10 RX ADMIN — FENTANYL CITRATE 50 MCG: 50 INJECTION, SOLUTION INTRAMUSCULAR; INTRAVENOUS at 17:14

## 2019-05-10 RX ADMIN — SUCCINYLCHOLINE CHLORIDE 140 MG: 20 INJECTION, SOLUTION INTRAMUSCULAR; INTRAVENOUS; PARENTERAL at 16:13

## 2019-05-10 RX ADMIN — FAMOTIDINE 20 MG: 10 INJECTION, SOLUTION INTRAVENOUS at 22:06

## 2019-05-10 RX ADMIN — HYDROMORPHONE HYDROCHLORIDE 0.5 MG: 1 INJECTION, SOLUTION INTRAMUSCULAR; INTRAVENOUS; SUBCUTANEOUS at 19:09

## 2019-05-10 RX ADMIN — ACETAMINOPHEN 650 MG: 325 TABLET, FILM COATED ORAL at 01:20

## 2019-05-10 RX ADMIN — SODIUM CHLORIDE: 9 INJECTION, SOLUTION INTRAVENOUS at 03:46

## 2019-05-10 RX ADMIN — DEXMEDETOMIDINE HYDROCHLORIDE 8 MCG: 100 INJECTION, SOLUTION INTRAVENOUS at 17:49

## 2019-05-10 RX ADMIN — HYDROMORPHONE HYDROCHLORIDE 0.5 MG: 1 INJECTION, SOLUTION INTRAMUSCULAR; INTRAVENOUS; SUBCUTANEOUS at 18:06

## 2019-05-10 RX ADMIN — PIPERACILLIN SODIUM,TAZOBACTAM SODIUM 3.38 G: 3; .375 INJECTION, POWDER, FOR SOLUTION INTRAVENOUS at 22:06

## 2019-05-10 RX ADMIN — ACETAMINOPHEN 650 MG: 325 TABLET, FILM COATED ORAL at 06:33

## 2019-05-10 RX ADMIN — DEXMEDETOMIDINE HYDROCHLORIDE 8 MCG: 100 INJECTION, SOLUTION INTRAVENOUS at 17:40

## 2019-05-10 RX ADMIN — ROCURONIUM BROMIDE 10 MG: 10 INJECTION INTRAVENOUS at 17:18

## 2019-05-10 RX ADMIN — FENTANYL CITRATE 50 MCG: 50 INJECTION, SOLUTION INTRAMUSCULAR; INTRAVENOUS at 16:46

## 2019-05-10 RX ADMIN — METRONIDAZOLE 500 MG: 500 INJECTION, SOLUTION INTRAVENOUS at 15:07

## 2019-05-10 RX ADMIN — PIPERACILLIN SODIUM AND TAZOBACTAM SODIUM 3.38 G: 3; .375 INJECTION, POWDER, LYOPHILIZED, FOR SOLUTION INTRAVENOUS at 16:45

## 2019-05-10 RX ADMIN — NEOSTIGMINE METHYLSULFATE 3.5 MG: 1 INJECTION, SOLUTION INTRAVENOUS at 18:06

## 2019-05-10 RX ADMIN — ACETAMINOPHEN 650 MG: 650 SUPPOSITORY RECTAL at 18:46

## 2019-05-10 ASSESSMENT — ACTIVITIES OF DAILY LIVING (ADL)
SWALLOWING: 0-->SWALLOWS FOODS/LIQUIDS WITHOUT DIFFICULTY
NUMBER_OF_TIMES_PATIENT_HAS_FALLEN_WITHIN_LAST_SIX_MONTHS: 8
AMBULATION: 0-->INDEPENDENT
FALL_HISTORY_WITHIN_LAST_SIX_MONTHS: YES
BATHING: 0-->INDEPENDENT
COGNITION: 0 - NO COGNITION ISSUES REPORTED
ADLS_ACUITY_SCORE: 19
RETIRED_EATING: 0-->INDEPENDENT
TOILETING: 0-->INDEPENDENT
DRESS: 0-->INDEPENDENT
ADLS_ACUITY_SCORE: 19
TRANSFERRING: 0-->INDEPENDENT
RETIRED_COMMUNICATION: 0-->UNDERSTANDS/COMMUNICATES WITHOUT DIFFICULTY

## 2019-05-10 NOTE — PROGRESS NOTES
MD Notification    Notified Person: MD    Notified Person Name:  Angle    Notification Date/Time: 5/10 0056     Notification Interaction: Paged    Purpose of Notification: Received critical lab value. Blood cultures from 5/9/19 1030 growing gram negative rods.     Orders Received: IV Rocephin q12hr ordered.    Comments: Pt receiving IV fluids. Sitter at bedside. Will continue to monitor closely.

## 2019-05-10 NOTE — ANESTHESIA PROCEDURE NOTES
ARTERIAL LINE PROCEDURE NOTE:   Pre-Procedure  Performed by Juan Pablo Orlando MD  Location: OR      Pre-Anesthestic Checklist: patient identified, IV checked, risks and benefits discussed and informed consent    Timeout  Correct Patient: Yes   Correct Procedure: Yes   Correct Site: Yes   Correct Laterality: N/A   Correct Position: Yes   Site Marked: N/A   .   Procedure Documentation  Procedure: arterial line    Supine  Insertion Site:left, radial.Injection technique: Seldinger Technique  .  .  Patient Prep;all elements of maximal sterile barrier technique followed, mask, hat, sterile gown, sterile gloves, draped, hand hygiene, chlorhexidine gluconate and isopropyl alcohol, patient draped    Assessment/Narrative    Catheter: 20 gauge, 12 cm     Secured by other  Tegaderm dressing used.    Arterial waveform: Yes     Comments:  Arterial Line  No complications

## 2019-05-10 NOTE — PROCEDURES
Procedure Date: 2019      ELECTROENCEPHALOGRAM       EEG # [facility]       DATE OF RECORDIN2019      This is a portable EEG.      CLINICAL SUMMARY:  The patient is a 48-year-old male with history of schizophrenia who presented with confusion who was brought to ER by his friend for unsteadiness and confusion.  The patient had a history of incorrectly taking his medication and there was a question of mixing of medications at this time as well.  The patient is reportedly receiving clonazepam, levetiracetam, clozapine, Cogentin.     TECHNICAL SUMMARY:  This EEG monitoring was performed with 23 scalp electrodes in the 10-20 system of placement and additional scalp, precordial and other surface electrodes were used for electrical referencing and artifact detection.      INTERICTAL ACTIVITY:  No well-organized posterior dominant rhythm was appreciated. The predominant background activity was 2-6 Hz theta-delta activity.  No epileptiform discharges were present.     CLINICAL EVENTS: The patient was reported to have nonsensical talk. There were no epileptiform or ictal discharges during this time. No electrographic or clinical seizures were recorded.      IMPRESSION:  This is an abnormal vEEG due to the presence of mild to moderate diffuse nonspecific encephalopathy.  No epileptiform discharges were present.  No electrographic or clinical seizures were recorded.         NILTON BURNS MD             D: 05/10/2019   T: 05/10/2019   MT: ANGELINA      Name:     MANE CONLEY   MRN:      [id number]        Account:        [id number]   :      1970           Procedure Date: 2019      Document: [id number]

## 2019-05-10 NOTE — PROGRESS NOTES
Update:    Discussed case with Dr. Mejia.  Both myself as well as Dr. Mejia have attempted to reach his  Luisa, as well as his emergency contact Jared to discuss necessity of procedure, and have been unsuccesful. Was able to reach Josue, his Mental Health Coordinator, and to Josue's knowledge the patient does not have a guardian or court appointed conservator.  Given his bacteremia, ongoing fevers and extensive gall bladder inflammation on imaging, he is at risk of decompensation which may include critical illness or death with any further delay prior to proceeding with surgery and therefore agree with Dr. Mejia that surgery is considered emergent.     Ravi Hammonds MD   Hospitalist  [phone]

## 2019-05-10 NOTE — PROVIDER NOTIFICATION
100.4 temp, tylenol given, recheck 102.6, paged hospitalist, admit as inpatient. Pt disoriented to place. VSS.

## 2019-05-10 NOTE — CONSULTS
Care Transition Initial Assessment - SW     Met with: Patient and Mental th      Active Problems:    Encephalopathy    E coli bacteremia       DATA  Lives With: facility resident   Description of Support System: Supportive, Involved  Who is your support system?:  Targeted  Luisa Falk- [employer] ([phone])  Identified issues/concerns regarding health management: discharge planning    ASSESSMENT  Cognitive Status:  awake, alert and disoriented  Concerns to be addressed: discharge planning.  SW consulted to assist with discharge planning, emotional support and transportation arrangements.  Pt is a 48 yr old male who transferred from the ED to Observation on 5/9/19 with encephalopathy.  Patient has long standing mental health diagnosis and lives independently at '[facility]' with 4 other men who support one another with medications and are visited 1-2x/wk by nurse who monitors medications and resolves issues. The home receives no nursing support on weekends although there is an on-call nurse for emergency that can be reached at: [phone]. Patient is his own decision maker per  Luisa Falk.   PLAN  Patient to return home upon discharge. Patient will likely need medical transport if not able to find his own ride.    ADDENDUM: CHARISSA informed that patient left unit for emergency surgery due to be septic. SW to continue to follow and assist with discharge planning. SW updated  CM of emergency surgery.     SW to continue to follow and assist with discharge planning.

## 2019-05-10 NOTE — PLAN OF CARE
VSS ex temp, MD aware. Tylenol given. Disoriented to place. SBA. NPO after the breakfast. Sx this amina

## 2019-05-10 NOTE — ANESTHESIA PREPROCEDURE EVALUATION
Anesthesia Pre-Procedure Evaluation    Patient: Octavio Loya   MRN: [id number] : 1970          Preoperative Diagnosis: unknown    Procedure(s):  CHOLECYSTECTOMY, LAPAROSCOPIC    Past Medical History:   Diagnosis Date     Anxiety      Schizophrenia (H)      Wrist pain, right      No past surgical history on file.    Anesthesia Evaluation     .             ROS/MED HX    ENT/Pulmonary:       Neurologic: Comment: encephalopathy      Cardiovascular:         METS/Exercise Tolerance:     Hematologic:         Musculoskeletal:         GI/Hepatic: Comment: E coli sepsis felt 2/2 cholecystitis        Renal/Genitourinary:         Endo:         Psychiatric:         Infectious Disease:         Malignancy:         Other:                          Physical Exam      Airway   Mallampati: II  TM distance: >3 FB  Neck ROM: full    Dental   (+) chipped and missing    Cardiovascular   Rhythm and rate: regular and abnormal      Pulmonary    breath sounds clear to auscultation            Lab Results   Component Value Date    WBC 7.7 05/10/2019    HGB 9.1 (L) 05/10/2019    HCT 26.5 (L) 05/10/2019    PLT 77 (L) 05/10/2019     05/10/2019    POTASSIUM 3.8 05/10/2019    CHLORIDE 112 (H) 05/10/2019    CO2 23 05/10/2019    BUN 10 05/10/2019    CR 0.87 05/10/2019    GLC 99 05/10/2019    VIJAY 7.8 (L) 05/10/2019    ALBUMIN 3.2 (L) 2019    PROTTOTAL 7.7 2019    ALT 18 2019    AST 12 2019    ALKPHOS 140 2019    BILITOTAL 1.1 2019    PTT 51 (H) 05/10/2019    INR 1.36 (H) 05/10/2019       Preop Vitals  BP Readings from Last 3 Encounters:   05/10/19 130/72   18 115/74   18 112/73    Pulse Readings from Last 3 Encounters:   05/10/19 112   18 77      Resp Readings from Last 3 Encounters:   05/10/19 20   18 18    SpO2 Readings from Last 3 Encounters:   05/10/19 97%   18 99%      Temp Readings from Last 1 Encounters:   05/10/19 37.3  C (99.2  F) (Oral)    Ht Readings from Last 1  "Encounters:   05/21/18 1.753 m (5' 9\")      Wt Readings from Last 1 Encounters:   05/09/19 65.8 kg (145 lb)    Estimated body mass index is 21.41 kg/m  as calculated from the following:    Height as of 5/21/18: 1.753 m (5' 9\").    Weight as of this encounter: 65.8 kg (145 lb).       Anesthesia Plan      History & Physical Review  History and physical reviewed and following examination; no interval change.    ASA Status:  3 emergent.        Plan for General, RSI and ETT     Additional equipment: Arterial Line Patient from [facility] with severe sepsis related to gall bladder.  emergently to ICU.        Postoperative Care      Consents  Anesthetic plan, risks, benefits and alternatives discussed with:  Implied consent/emergency..                 Juan Pablo Orlando MD  "

## 2019-05-10 NOTE — PLAN OF CARE
Observation goals PRIOR TO DISCHARGE     Comments: -diagnostic tests and consults completed and resulted -not met, psych consult today  -vital signs normal or at patient baseline -temp 99.9, hypotensive, other VSS on RA.   -safe disposition plan has been identified -not met  Nurse to notify provider when observation goals have been met and patient is ready for discharge.

## 2019-05-10 NOTE — PROGRESS NOTES
[facility]    Medicine Progress Note - Hospitalist Service       Date of Admission:  5/9/2019  Assessment & Plan   Octavio Loya is a 48 year-old male admitted on 5/9/2019.      Sepsis secondary to E coli bacteremia  Presented with confusion and gait difficulty as below. Afebrile though white blood cell count and lactic acid were elevated. Preliminary infectious work-up negative with normal chest x-ray, normal UA, LP with 4 WBC.   - Blood culture from admission positive with E coli   - Continue ceftriaxone IV, change to q24h dosing  - CT abdomen/pelvis to determine source of bacteremia including evaluation for diverticulitis given LLQ tenderness on exam 5/10. If continues to have fevers and/or CT reveals diverticulitis consider change of antibiotics to piperacillin-tazobactam IV   - Monitor fever curve  - WBC normalized     ADDENDUM:   CT shows marked inflammation in gallbladder. Re-examined patient, no rebound tenderness or guarding at this time. Keep NPO, add metronidazole IV and will consult general surgery.     Acute metabolic encephalopathy   Presents after his friend reported that while waiting for the bus for work this morning that the patient seemed more confused and had unsteadiness that was more than usual, and so his friend brought him in for further evaluation. He reportedly takes his medication on his own at the group home (not staffed full time) and has a history of taking medications incorrectly in the past. Patient denies any history of seizure, is maintained on levetiracetam for prophylaxis due to high doses of clozapine. He has not had any witnessed seizure activity reported.  - Given positive blood cultures, sepsis/infection likely significant contributor to encephalopathy, though medication effect either in conjunction with infection or with infection leading to incorrect doses taking by patient remaining a possibility  - Psychiatry consulted, see below  - EEG with diffuse  changes consistent with encephalopathy, no epileptiform changes   - Hold neurology consult as he has plausible explanations for his presentation/symptoms  - Supportive cares   - Re-orient as needed  - Maintain normal day/night, sleep wake cycles  - Minimize sedating/altering medications as able  - Treat separate conditions as detailed above/below  - PT/OT to assess if appropriate to return to group home    Schizophrenia  Anxiety  Prior to admission on high dose clozapine (300 mg in evening, 500 mg at bedtime) and prophylactic levetiracetam as a result of high dose. Also on propranolol, sertraline, buspirone, clonazepam.   - Psychiatry consulted to review medications and re-initiate when appropriate, discussed with Dr. Smith. Off prior to admission regimen for now  - Psychiatry re-consulted for 5/11/19 to continue following for appropriate medication re-initiation     Elevated lactic acid   Recheck resolved     Elevated creatinine  Mild hyponatremia  Creatinine 1.37 on admission. Most recent creatinine available in [facility] 1.15 9/2017.   - Sodium normalized. BUN and Cr pending for 5/10/2019   - Continue IVF, given sodium now on high-end of normal will change fluids to lactated ringers    Mild hypokalemia   - Potassium replacement protocol     Stress hyperglycemia  - Hemoglobin A1c normal at 4.9%  - Treat infection as above     Thrombocytopenia  Baseline platelet count in low 100s. Decrease likely due to sepsis/bacteremia. No signs of bleeding.  - Monitor CBC         Diet: NPO for Medical/Clinical Reasons Except for: Meds    DVT Prophylaxis: Pneumatic Compression Devices  Schulz Catheter: not present  Code Status: Full Code      Disposition Plan   Expected discharge: Admit to inpatient given E coli bacteremia. Anticipate discharge back to group home in 1-2 days pending CT results transition to oral antibiotics, PT assessment  Entered: Ravi Hammonds MD 05/10/2019, 9:24 AM       The patient's care was  discussed with the Bedside Nurse and Patient.    Ravi Hammonds MD  Hospitalist Service  [facility]    ______________________________________________________________________    Interval History   Overnight noted to have positive blood cultures. Also required sitter due to pulling at IV line. This morning he denies shortness of breath, chest pain, abdominal pain.     Data reviewed today: I reviewed all medications, new labs and imaging results over the last 24 hours. I personally reviewed no images or EKG's today.    Physical Exam   Vital Signs: Temp: 101.5  F (38.6  C) Temp src: Oral BP: 96/66 Pulse: 94 Heart Rate: 107 Resp: 18 SpO2: 99 % O2 Device: None (Room air)    Weight: 145 lbs 0 oz    Constitutional:  NAD  Respiratory: Clear to auscultation bilaterally, good air movement bilaterally  Cardiovascular: RRR, no m/r/g. No peripheral edema.  GI: Soft, mild LLQ tenderness without rebound or guarding, non-distended. BS normoactive.   Skin/Integumen: Warm, dry  Neuro: Alert. Oriented to self and year. Not able to state location and not definitive about month.      Data   Recent Labs   Lab 05/10/19  0630 05/09/19  0815   WBC 8.8 20.3*   HGB 11.0* 12.5*   MCV 87 86   PLT 98* 114*   NA  --  132*   POTASSIUM 3.7 3.3*   CHLORIDE  --  100   CO2  --  20   BUN  --  18   CR  --  1.37*   ANIONGAP  --  12   VIJAY  --  9.1   GLC  --  246*   ALBUMIN  --  3.2*   PROTTOTAL  --  7.7   BILITOTAL  --  1.1   ALKPHOS  --  140   ALT  --  18   AST  --  12       No results found for this or any previous visit (from the past 24 hour(s)).  Medications     sodium chloride 100 mL/hr at 05/10/19 0346       cefTRIAXone  2 g Intravenous Q24H     ranitidine  150 mg Oral BID

## 2019-05-10 NOTE — PLAN OF CARE
Patient transferred to Pre-op with transport aide and nursing assistant. Patient pale, has chills, and anxious. VS obtained. /72 (BP Location: Right arm)   Pulse 112   Temp 99.2  F (37.3  C) (Oral)   Resp 20  SpO2 97%. IV Flagyl infusing upon arrival to unit, sent with patient to pre-op.
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
## 2019-05-10 NOTE — PROGRESS NOTES
SW: SW received voicemail from CLAUDIA Davison CM for [employer]. Luisa provided contact #[phone] and indicated in her message that she will stop by hospital today to visit patient. SW left voicemail message, requesting assist with discharge planning.    Full SW assessment to follow. SW to continue to follow and assist with discharge planning.

## 2019-05-10 NOTE — OR NURSING
Spoke to the  Luisa.  Pts emergency contact Jared did call and was updated on patient status.  Surgeon will call emergency contact when procedure is finished.

## 2019-05-10 NOTE — OR NURSING
Spoke to patients  Luisa.  She was able to confirm that patient does not have any legal guardianship.   left messages with both patients brothers but was unable to get a hold of them.

## 2019-05-10 NOTE — BRIEF OP NOTE
[facility]  Brief Operative Note    Pre-operative diagnosis: Cholecystitis  Post-operative diagnosis: Same    Procedure  LAPAROSCOPIC CHOLECYSTECTOMY    Surgeon:     * Vannessa Mejia MD - Primary     * Catie Tucker PA-C - Assisting     Anesthesia: General     Estimated blood loss: 300 mL    Specimens:   ID Type Source Tests Collected by Time Destination   1 : bile Fluid Other ANAEROBIC BACTERIAL CULTURE, FLUID CULTURE AEROBIC BACTERIAL, GRAM STAIN Vannessa Mejia MD 5/10/2019  4:49 PM    A :  Tissue Gallbladder and Contents SURGICAL PATHOLOGY EXAM Vannessa Mejia MD 5/10/2019  4:49 PM      Findings:  Severe cholecystitis with necrosis, purulent fluid encountered. Dome-down approach. Partial cholecystectomy performed, stapled across infundibulum. Drain placed in gallbladder fossa. STAT gram stain of bile with moderate gram-neg rods. No immediate complications. See operative report for full details. Patient to be extubated in PACU and transferred to ICU.        Catie Tucker PA-C  [facility]  [phone]

## 2019-05-10 NOTE — PLAN OF CARE
Observation goals PRIOR TO DISCHARGE     Comments: -diagnostic tests and consults completed and resulted -not met, psych consult tomorrow  -vital signs normal or at patient baseline -temp 100, other VSS on Ra.   -safe disposition plan has been identified -not met  Nurse to notify provider when observation goals have been met and patient is ready for discharge.

## 2019-05-10 NOTE — PROGRESS NOTES
MD Notification    Notified Person: MD    Notified Person Name: Dr. Barbour    Notification Date/Time: 5/10/19 4578    Notification Interaction:  Text Paged    Purpose of Notification: Pt's blood culture growing E. Coli    Orders Received: Have orders for Ceftriaxone.     Comments: Will continue to monitor.

## 2019-05-10 NOTE — PLAN OF CARE
PRIMARY DIAGNOSIS: Altered mental status  OUTPATIENT/OBSERVATION GOALS TO BE MET BEFORE DISCHARGE:  1. ADLs back to baseline: No    2. Activity and level of assistance: Up with standby assistance.    3. Pain status: Improved-controlled with oral pain medications.    4. Return to near baseline physical activity: No     Discharge Planner Nurse   Safe discharge environment identified: Yes  Barriers to discharge: Yes       Entered by: Joshua Mcneal 05/09/2019 7:26 PM     Please review provider order for any additional goals.   Nurse to notify provider when observation goals have been met and patient is ready for discharge.    Pt A&Ox3. Temperature trending down. BC pending. EEG results pending. Directive and STEVE completed. Pt has sitter at bedside. IVF at 100/hr. Psych consulted.

## 2019-05-10 NOTE — PLAN OF CARE
[id number]: Pt disoriented to situation, rambling speech at times. Temp 100.3-tylenol given, Hypotensive, other VSS on RA. BC growing gram negative rods, e coli. -IV rocephin started overnight. EEG results pending.  Sitter at bedside. Pt pulled out IV on evening shift. New IV- IVF at 100/hr. Up SBA to BR, voiding adequately. Pt showered. Regular diet. Psych consult today.

## 2019-05-10 NOTE — PLAN OF CARE
Observation goals PRIOR TO DISCHARGE     Comments: -diagnostic tests and consults completed and resulted -not met, psych consult today  -vital signs normal or at patient baseline -partially met, hypotensive, other VSS on RA.   -safe disposition plan has been identified -not met  Nurse to notify provider when observation goals have been met and patient is ready for discharge.

## 2019-05-10 NOTE — PROGRESS NOTES
X cover    Called about Bcx + for GNR.    Will plan on IV ceftriaxone 1g q 12, urine looked ok.    Carlos Barbour MD

## 2019-05-10 NOTE — OR NURSING
Attempted to call emergency contact which is his brother Jared.  No answer.    Other contact was listed as other so RN did not call.

## 2019-05-10 NOTE — CONSULTS
[facility] General Surgery Consultation    Octavio Loya MRN# [id number]   YOB: 1970 Age: 48 year old      Date of Admission:  5/9/2019  Date of Consult: 5/10/2019         Assessment and Plan:   Patient is a 48 year old male with PMH s/f schizophrenia who presented with confusion and concern for encephalopathy but was found to have Ecoli bacteremia and CT abdomen revealed signs consistent with acute cholecystitis with a very thickened gallbladder wall with pericholecystic fluid. He is not sure if he has a guardian when asked but does report someone helps him make medical decisions. He is unsure of who. His brother is listed as his emergency contact and I will try him. I have tried all listed emergency contacts without success. It is not formally documented in his chart that there is a guardian. I have discussed his care with Dr. Hammonds. This is considered emergent given his bacteremia and acute cholecystitis with mild tachycardia and fevers.     PLAN:  Laparoscopic cholecystectomy, possible open  Continue abx  Appreciate hospitalist management         Requesting Physician:      Dr. Hammonds        Chief Complaint:     Chief Complaint   Patient presents with     Balance/ Vestibular          History of Present Illness:   Octavio Loya is a 48 year old male who was seen in consultation at the request of Dr. Hammonds who presented with confusion. History is very difficult to obtain from patient. He is not sure where he is and tells me one moment he lives in [place] and the next tells me he lives in a group home in [place]. He does endorse abdominal pain. Not sure when it started. No prior abdominal surgeries.            Physical Exam:   Blood pressure 99/63, pulse 85, temperature 99.9  F (37.7  C), temperature source Oral, resp. rate 18, weight 65.8 kg (145 lb), SpO2 98 %.  145 lbs 0 oz  General: alert, sitting up in bed  Psych: Alert and Oriented.  Normal  affect  Neurological: grossly intact  Eyes: Sclera clear  Respiratory:  nonlabored breathing  Cardiovascular:  Regular Rate and Rhythm   GI: soft, tender to palpation on upper abdomen   Lymphatic/Hematologic/Immune:  No femoral or cervical lymphadenopathy.  Integumentary:  No rashes         Past Medical History:     Past Medical History:   Diagnosis Date     Anxiety      Schizophrenia (H)      Wrist pain, right             Past Surgical History:   None per patient report         Current Medications:           cefTRIAXone  2 g Intravenous Q24H     metroNIDAZOLE  500 mg Intravenous Q8H     ranitidine  150 mg Oral BID       acetaminophen, acetaminophen, bisacodyl, melatonin, naloxone, ondansetron **OR** ondansetron, polyethylene glycol, potassium chloride, potassium chloride with lidocaine, potassium chloride, potassium chloride, potassium chloride, senna-docusate **OR** senna-docusate         Home Medications:     Prior to Admission medications    Medication Sig Last Dose Taking? Auth Provider   busPIRone HCl (BUSPAR) 30 MG tablet Take 30 mg by mouth 2 times daily 5/9/2019 at am Yes Reported, Patient   clonazePAM (KLONOPIN) 1 MG tablet Take 0.5 mg by mouth At Bedtime  5/8/2019 at pm Yes Reported, Patient   cloZAPine (CLOZARIL) 100 MG tablet Take 300 mg by mouth every evening At 5 pm 5/8/2019 at 5pm Yes Unknown, Entered By History   cloZAPine (CLOZARIL) 100 MG tablet Take 500 mg by mouth At Bedtime  5/8/2019 at 2200 Yes Reported, Patient   levETIRAcetam (KEPPRA) 500 MG tablet Take 500 mg by mouth 2 times daily  5/9/2019 at am Yes Reported, Patient   multivitamin, therapeutic (THERA-VIT) TABS tablet Take 1 tablet by mouth daily 5/9/2019 at am Yes Unknown, Entered By History   propranolol (INDERAL) 40 MG tablet Take 40 mg by mouth 5/9/2019 at am Yes Reported, Patient   ranitidine (ZANTAC) 150 MG tablet Take 150 mg by mouth 2 times daily 5/9/2019 at am Yes Unknown, Entered By History   sennosides (SENOKOT) 8.6 MG tablet  Take 1 tablet by mouth 2 times daily as needed for constipation  at prn Yes Unknown, Entered By History   sertraline (ZOLOFT) 50 MG tablet Take 50 mg by mouth daily 5/9/2019 at am Yes Reported, Patient            Allergies:     Allergies   Allergen Reactions     Depakote [Valproic Acid]             Family History:   Reviewed         Social History:   Octavio Loya  reports that he has never smoked. He uses smokeless tobacco. He reports that he does not drink alcohol or use drugs.          Review of Systems:   The 10 point Review of Systems is negative other than noted in the HPI.         Labs/Imaging   All new lab and imaging data was reviewed.   I have personally reviewed the imaging studies including his CT        Vannessa Mejia MD

## 2019-05-10 NOTE — OR NURSING
Pt was declared an emergency case by Dr. Mejia.  Preop checklist, consent, and passport not done.

## 2019-05-11 ENCOUNTER — APPOINTMENT (OUTPATIENT)
Dept: ULTRASOUND IMAGING | Facility: CLINIC | Age: 49
End: 2019-05-11
Attending: INTERNAL MEDICINE
Payer: COMMERCIAL

## 2019-05-11 ENCOUNTER — APPOINTMENT (OUTPATIENT)
Dept: OCCUPATIONAL THERAPY | Facility: CLINIC | Age: 49
End: 2019-05-11
Attending: PHYSICIAN ASSISTANT
Payer: COMMERCIAL

## 2019-05-11 LAB
ALBUMIN SERPL-MCNC: 2 G/DL (ref 3.4–5)
ALP SERPL-CCNC: 105 U/L (ref 40–150)
ALT SERPL W P-5'-P-CCNC: 24 U/L (ref 0–70)
ANION GAP SERPL CALCULATED.3IONS-SCNC: 6 MMOL/L (ref 3–14)
AST SERPL W P-5'-P-CCNC: 22 U/L (ref 0–45)
BILIRUB DIRECT SERPL-MCNC: 0.2 MG/DL (ref 0–0.2)
BILIRUB SERPL-MCNC: 0.5 MG/DL (ref 0.2–1.3)
BUN SERPL-MCNC: 8 MG/DL (ref 7–30)
CALCIUM SERPL-MCNC: 7.9 MG/DL (ref 8.5–10.1)
CHLORIDE SERPL-SCNC: 112 MMOL/L (ref 94–109)
CO2 BLD-SCNC: 22 MMOL/L (ref 21–28)
CO2 SERPL-SCNC: 25 MMOL/L (ref 20–32)
CREAT SERPL-MCNC: 0.76 MG/DL (ref 0.66–1.25)
ERYTHROCYTE [DISTWIDTH] IN BLOOD BY AUTOMATED COUNT: 14.2 % (ref 10–15)
GFR SERPL CREATININE-BSD FRML MDRD: >90 ML/MIN/{1.73_M2}
GLUCOSE BLDC GLUCOMTR-MCNC: 107 MG/DL (ref 70–99)
GLUCOSE BLDC GLUCOMTR-MCNC: 69 MG/DL (ref 70–99)
GLUCOSE BLDC GLUCOMTR-MCNC: 69 MG/DL (ref 70–99)
GLUCOSE BLDC GLUCOMTR-MCNC: 78 MG/DL (ref 70–99)
GLUCOSE BLDC GLUCOMTR-MCNC: 82 MG/DL (ref 70–99)
GLUCOSE BLDC GLUCOMTR-MCNC: 97 MG/DL (ref 70–99)
GLUCOSE SERPL-MCNC: 84 MG/DL (ref 70–99)
HCT VFR BLD AUTO: 26.6 % (ref 40–53)
HCT VFR BLD CALC: 23 %PCV (ref 40–53)
HGB BLD CALC-MCNC: 7.8 G/DL (ref 13.3–17.7)
HGB BLD-MCNC: 9.1 G/DL (ref 13.3–17.7)
LACTATE BLD-SCNC: 0.9 MMOL/L (ref 0.7–2)
MCH RBC QN AUTO: 29.7 PG (ref 26.5–33)
MCHC RBC AUTO-ENTMCNC: 34.2 G/DL (ref 31.5–36.5)
MCV RBC AUTO: 87 FL (ref 78–100)
PCO2 BLD: 40 MM HG (ref 35–45)
PH BLD: 7.34 PH (ref 7.35–7.45)
PLATELET # BLD AUTO: 84 10E9/L (ref 150–450)
PO2 BLD: 218 MM HG (ref 80–105)
POTASSIUM BLD-SCNC: 3.9 MMOL/L (ref 3.4–5.3)
POTASSIUM SERPL-SCNC: 3.7 MMOL/L (ref 3.4–5.3)
PROT SERPL-MCNC: 5 G/DL (ref 6.8–8.8)
RBC # BLD AUTO: 3.06 10E12/L (ref 4.4–5.9)
SAO2 % BLDA FROM PO2: 100 % (ref 92–100)
SODIUM BLD-SCNC: 142 MMOL/L (ref 133–144)
SODIUM SERPL-SCNC: 143 MMOL/L (ref 133–144)
WBC # BLD AUTO: 6.3 10E9/L (ref 4–11)

## 2019-05-11 PROCEDURE — 00000146 ZZHCL STATISTIC GLUCOSE BY METER IP

## 2019-05-11 PROCEDURE — 80048 BASIC METABOLIC PNL TOTAL CA: CPT | Performed by: PHYSICIAN ASSISTANT

## 2019-05-11 PROCEDURE — 93005 ELECTROCARDIOGRAM TRACING: CPT

## 2019-05-11 PROCEDURE — 36415 COLL VENOUS BLD VENIPUNCTURE: CPT | Performed by: INTERNAL MEDICINE

## 2019-05-11 PROCEDURE — 99232 SBSQ HOSP IP/OBS MODERATE 35: CPT | Performed by: PSYCHIATRY & NEUROLOGY

## 2019-05-11 PROCEDURE — 25000132 ZZH RX MED GY IP 250 OP 250 PS 637: Performed by: PHYSICIAN ASSISTANT

## 2019-05-11 PROCEDURE — 25800025 ZZH RX 258: Performed by: INTERNAL MEDICINE

## 2019-05-11 PROCEDURE — 25000128 H RX IP 250 OP 636: Performed by: PHYSICIAN ASSISTANT

## 2019-05-11 PROCEDURE — 12000000 ZZH R&B MED SURG/OB

## 2019-05-11 PROCEDURE — 80159 DRUG ASSAY CLOZAPINE: CPT | Performed by: PHYSICIAN ASSISTANT

## 2019-05-11 PROCEDURE — 25800030 ZZH RX IP 258 OP 636: Performed by: PHYSICIAN ASSISTANT

## 2019-05-11 PROCEDURE — 83605 ASSAY OF LACTIC ACID: CPT | Performed by: INTERNAL MEDICINE

## 2019-05-11 PROCEDURE — 93970 EXTREMITY STUDY: CPT

## 2019-05-11 PROCEDURE — 99233 SBSQ HOSP IP/OBS HIGH 50: CPT | Performed by: INTERNAL MEDICINE

## 2019-05-11 PROCEDURE — 97535 SELF CARE MNGMENT TRAINING: CPT | Mod: GO

## 2019-05-11 PROCEDURE — 40000914 ZZH STATISTIC SITTER, DAY HOURS

## 2019-05-11 PROCEDURE — 80076 HEPATIC FUNCTION PANEL: CPT | Performed by: PHYSICIAN ASSISTANT

## 2019-05-11 PROCEDURE — 25000132 ZZH RX MED GY IP 250 OP 250 PS 637: Performed by: INTERNAL MEDICINE

## 2019-05-11 PROCEDURE — 97530 THERAPEUTIC ACTIVITIES: CPT | Mod: GO

## 2019-05-11 PROCEDURE — 93010 ELECTROCARDIOGRAM REPORT: CPT | Performed by: INTERNAL MEDICINE

## 2019-05-11 PROCEDURE — 85027 COMPLETE CBC AUTOMATED: CPT | Performed by: PHYSICIAN ASSISTANT

## 2019-05-11 PROCEDURE — 97166 OT EVAL MOD COMPLEX 45 MIN: CPT | Mod: GO

## 2019-05-11 RX ORDER — DEXTROSE MONOHYDRATE, SODIUM CHLORIDE, SODIUM LACTATE, POTASSIUM CHLORIDE, CALCIUM CHLORIDE 5; 600; 310; 179; 20 G/100ML; MG/100ML; MG/100ML; MG/100ML; MG/100ML
INJECTION, SOLUTION INTRAVENOUS CONTINUOUS
Status: DISCONTINUED | OUTPATIENT
Start: 2019-05-11 | End: 2019-05-13

## 2019-05-11 RX ORDER — OXYCODONE HYDROCHLORIDE 5 MG/1
5 TABLET ORAL EVERY 4 HOURS PRN
Status: DISCONTINUED | OUTPATIENT
Start: 2019-05-11 | End: 2019-05-12

## 2019-05-11 RX ORDER — PROPRANOLOL HYDROCHLORIDE 10 MG/1
10 TABLET ORAL 3 TIMES DAILY
Status: DISCONTINUED | OUTPATIENT
Start: 2019-05-11 | End: 2019-05-15 | Stop reason: HOSPADM

## 2019-05-11 RX ADMIN — OXYCODONE HYDROCHLORIDE 5 MG: 5 TABLET ORAL at 17:05

## 2019-05-11 RX ADMIN — PIPERACILLIN SODIUM,TAZOBACTAM SODIUM 3.38 G: 3; .375 INJECTION, POWDER, FOR SOLUTION INTRAVENOUS at 16:59

## 2019-05-11 RX ADMIN — ACETAMINOPHEN 650 MG: 325 TABLET, FILM COATED ORAL at 14:23

## 2019-05-11 RX ADMIN — SODIUM CHLORIDE, POTASSIUM CHLORIDE, SODIUM LACTATE AND CALCIUM CHLORIDE: 600; 310; 30; 20 INJECTION, SOLUTION INTRAVENOUS at 10:46

## 2019-05-11 RX ADMIN — HYDROMORPHONE HYDROCHLORIDE 0.5 MG: 1 INJECTION, SOLUTION INTRAMUSCULAR; INTRAVENOUS; SUBCUTANEOUS at 08:28

## 2019-05-11 RX ADMIN — OMEPRAZOLE 40 MG: 20 CAPSULE, DELAYED RELEASE ORAL at 08:27

## 2019-05-11 RX ADMIN — PROPRANOLOL HYDROCHLORIDE 10 MG: 10 TABLET ORAL at 19:13

## 2019-05-11 RX ADMIN — DEXTROSE MONOHYDRATE, SODIUM CHLORIDE, SODIUM LACTATE, POTASSIUM CHLORIDE, CALCIUM CHLORIDE: 5; 600; 310; 179; 20 INJECTION, SOLUTION INTRAVENOUS at 14:25

## 2019-05-11 RX ADMIN — HYDROMORPHONE HYDROCHLORIDE 0.5 MG: 1 INJECTION, SOLUTION INTRAMUSCULAR; INTRAVENOUS; SUBCUTANEOUS at 06:19

## 2019-05-11 RX ADMIN — PIPERACILLIN SODIUM,TAZOBACTAM SODIUM 3.38 G: 3; .375 INJECTION, POWDER, FOR SOLUTION INTRAVENOUS at 03:21

## 2019-05-11 RX ADMIN — PIPERACILLIN SODIUM,TAZOBACTAM SODIUM 3.38 G: 3; .375 INJECTION, POWDER, FOR SOLUTION INTRAVENOUS at 21:29

## 2019-05-11 RX ADMIN — PROPRANOLOL HYDROCHLORIDE 10 MG: 10 TABLET ORAL at 12:51

## 2019-05-11 RX ADMIN — PIPERACILLIN SODIUM,TAZOBACTAM SODIUM 3.38 G: 3; .375 INJECTION, POWDER, FOR SOLUTION INTRAVENOUS at 09:57

## 2019-05-11 RX ADMIN — OXYCODONE HYDROCHLORIDE 5 MG: 5 TABLET ORAL at 21:28

## 2019-05-11 RX ADMIN — ENOXAPARIN SODIUM 40 MG: 40 INJECTION SUBCUTANEOUS at 11:05

## 2019-05-11 RX ADMIN — ONDANSETRON 4 MG: 2 INJECTION INTRAMUSCULAR; INTRAVENOUS at 10:46

## 2019-05-11 RX ADMIN — PROPRANOLOL HYDROCHLORIDE 10 MG: 10 TABLET ORAL at 21:28

## 2019-05-11 RX ADMIN — HYDROMORPHONE HYDROCHLORIDE 0.3 MG: 1 INJECTION, SOLUTION INTRAMUSCULAR; INTRAVENOUS; SUBCUTANEOUS at 02:25

## 2019-05-11 ASSESSMENT — ACTIVITIES OF DAILY LIVING (ADL)
ADLS_ACUITY_SCORE: 17
ADLS_ACUITY_SCORE: 14
ADLS_ACUITY_SCORE: 15
ADLS_ACUITY_SCORE: 13
PREVIOUS_RESPONSIBILITIES: MEAL PREP;LAUNDRY

## 2019-05-11 NOTE — CONSULTS
[facility] Psychiatric Consult Progress Note    Interval History:   Pt seen, chart reviewed, case discussed with nursing staff and treating clinicians. Patient recovering in ICU after acute surgical management last evening of gangrenous cholecystitis with e coli bacteremia and consequent sepsis. Psychiatric remains held. On interview today patient reports feeling a bit better this monrning. Looks a bit distracted, not overly interactive with me. However psychiatric fitness is stable at this time. No concerns. Advised patient we'll continue to hold his Clozaril and Keppra.     Review of systems:   10 point Review of Systems completed by Alin Smith DO, and is  is negative other than noted in the HPI     Medications:       enoxaparin  40 mg Subcutaneous Q24H     omeprazole  40 mg Oral QAM AC     piperacillin-tazobactam  3.375 g Intravenous Q6H     propranolol  10 mg Oral TID     acetaminophen, acetaminophen, bisacodyl, diazepam, HYDROmorphone, melatonin, naloxone, ondansetron **OR** ondansetron, polyethylene glycol, potassium chloride, potassium chloride with lidocaine, potassium chloride, potassium chloride, potassium chloride, senna-docusate **OR** senna-docusate    Mental Status Examination:   Appearance:  awake, alert  Eye Contact:  fair  Speech:  clear, coherent  Language:Normal  Psychomotor Behavior:  no evidence of tardive dyskinesia, dystonia, or tics  Mood:  stable  Affect:  appropriate and in normal range  Thought Process:  logical, linear and goal oriented no loose associations  Thought Content:  no evidence of suicidal ideation or homicidal ideation and no evidence of psychotic thought  Oriented to:  grossly  Attention Span and Concentration: fair  Recent and Remote Memory:  poor  Fund of Knowledge: low  Muscle Strength and Tone: normal  Gait and Station: Not assessed  Insight:  limited  Judgment:  fair       Labs/Vitals:     Recent Results (from the past 24 hour(s))   Basic metabolic  panel    Collection Time: 05/10/19  4:34 PM   Result Value Ref Range    Sodium 141 133 - 144 mmol/L    Potassium 3.8 3.4 - 5.3 mmol/L    Chloride 112 (H) 94 - 109 mmol/L    Carbon Dioxide 23 20 - 32 mmol/L    Anion Gap 6 3 - 14 mmol/L    Glucose 99 70 - 99 mg/dL    Urea Nitrogen 10 7 - 30 mg/dL    Creatinine 0.87 0.66 - 1.25 mg/dL    GFR Estimate >90 >60 mL/min/[1.73_m2]    GFR Estimate If Black >90 >60 mL/min/[1.73_m2]    Calcium 7.8 (L) 8.5 - 10.1 mg/dL   CBC with platelets    Collection Time: 05/10/19  4:34 PM   Result Value Ref Range    WBC 7.7 4.0 - 11.0 10e9/L    RBC Count 3.07 (L) 4.4 - 5.9 10e12/L    Hemoglobin 9.1 (L) 13.3 - 17.7 g/dL    Hematocrit 26.5 (L) 40.0 - 53.0 %    MCV 86 78 - 100 fl    MCH 29.6 26.5 - 33.0 pg    MCHC 34.3 31.5 - 36.5 g/dL    RDW 14.1 10.0 - 15.0 %    Platelet Count 77 (L) 150 - 450 10e9/L   INR    Collection Time: 05/10/19  4:34 PM   Result Value Ref Range    INR 1.36 (H) 0.86 - 1.14   Lactic acid whole blood    Collection Time: 05/10/19  4:34 PM   Result Value Ref Range    Lactic Acid 0.6 (L) 0.7 - 2.0 mmol/L   Partial thromboplastin time    Collection Time: 05/10/19  4:34 PM   Result Value Ref Range    PTT 51 (H) 22 - 37 sec   ISTAT gases elec art POCT    Collection Time: 05/10/19  4:43 PM   Result Value Ref Range    pH Arterial 7.34 (L) 7.35 - 7.45 pH    pCO2 Arterial 40 35 - 45 mm Hg    pO2 Arterial 218 (H) 80 - 105 mm Hg    Bicarbonate Arterial 22 21 - 28 mmol/L    O2 Sat Arterial 100 92 - 100 %    Sodium 142 133 - 144 mmol/L    Potassium 3.9 3.4 - 5.3 mmol/L    Hemoglobin 7.8 (L) 13.3 - 17.7 g/dL    Hematocrit - POCT 23 (L) 40.0 - 53.0 %PCV   Anaerobic bacterial culture    Collection Time: 05/10/19  4:49 PM   Result Value Ref Range    Specimen Description Bile     Special Requests       Specimen collected in surgery  Received in anaerobic tubes.      Culture Micro PENDING    Gram stain    Collection Time: 05/10/19  4:49 PM   Result Value Ref Range    Specimen Description  Bile     Special Requests Specimen collected in surgery     Gram Stain Moderate  Gram negative rods   (A)     Gram Stain Many  WBC'S seen  predominantly PMN's       Gram Stain       Preliminary Gram stain report called to and read back by  DOV MENESES IN SURGERY [id number] RK      Gram Stain       Gram stain review consistent with reported results.    Gram Stain       Gram stain slide reviewed at the [facility]   ABO/Rh type and screen    Collection Time: 05/10/19  4:49 PM   Result Value Ref Range    Units Ordered 2     ABO A     RH(D) Pos     Antibody Screen Neg     Test Valid Only At [facility]        Specimen Expires 05/13/2019     Crossmatch Red Blood Cells    Blood component    Collection Time: 05/10/19  4:49 PM   Result Value Ref Range    Unit Number [id number]     Blood Component Type Red Blood Cells Leukocyte Reduced     Division Number 00     Status of Unit Ready for patient 05/10/2019 1742     Blood Product Code T3772H26     Unit Status SOLEDAD    Blood component    Collection Time: 05/10/19  4:49 PM   Result Value Ref Range    Unit Number [id number]     Blood Component Type Red Blood Cells Leukocyte Reduced     Division Number 00     Status of Unit Ready for patient 05/10/2019 1742     Blood Product Code V1346X33     Unit Status SOLEDAD    Glucose by meter    Collection Time: 05/10/19  6:53 PM   Result Value Ref Range    Glucose 95 70 - 99 mg/dL   Glucose by meter    Collection Time: 05/10/19  8:43 PM   Result Value Ref Range    Glucose 107 (H) 70 - 99 mg/dL   Glucose by meter    Collection Time: 05/11/19 12:31 AM   Result Value Ref Range    Glucose 97 70 - 99 mg/dL   Glucose by meter    Collection Time: 05/11/19  5:07 AM   Result Value Ref Range    Glucose 82 70 - 99 mg/dL   Basic metabolic panel    Collection Time: 05/11/19  5:09 AM   Result Value Ref Range    Sodium 143 133 - 144 mmol/L    Potassium 3.7 3.4 - 5.3 mmol/L    Chloride 112 (H) 94 - 109 mmol/L     Carbon Dioxide 25 20 - 32 mmol/L    Anion Gap 6 3 - 14 mmol/L    Glucose 84 70 - 99 mg/dL    Urea Nitrogen 8 7 - 30 mg/dL    Creatinine 0.76 0.66 - 1.25 mg/dL    GFR Estimate >90 >60 mL/min/[1.73_m2]    GFR Estimate If Black >90 >60 mL/min/[1.73_m2]    Calcium 7.9 (L) 8.5 - 10.1 mg/dL   CBC with platelets    Collection Time: 05/11/19  5:09 AM   Result Value Ref Range    WBC 6.3 4.0 - 11.0 10e9/L    RBC Count 3.06 (L) 4.4 - 5.9 10e12/L    Hemoglobin 9.1 (L) 13.3 - 17.7 g/dL    Hematocrit 26.6 (L) 40.0 - 53.0 %    MCV 87 78 - 100 fl    MCH 29.7 26.5 - 33.0 pg    MCHC 34.2 31.5 - 36.5 g/dL    RDW 14.2 10.0 - 15.0 %    Platelet Count 84 (L) 150 - 450 10e9/L   Hepatic panel    Collection Time: 05/11/19  5:09 AM   Result Value Ref Range    Bilirubin Direct 0.2 0.0 - 0.2 mg/dL    Bilirubin Total 0.5 0.2 - 1.3 mg/dL    Albumin 2.0 (L) 3.4 - 5.0 g/dL    Protein Total 5.0 (L) 6.8 - 8.8 g/dL    Alkaline Phosphatase 105 40 - 150 U/L    ALT 24 0 - 70 U/L    AST 22 0 - 45 U/L   EKG 12-lead, tracing only    Collection Time: 05/11/19  8:54 AM   Result Value Ref Range    Interpretation ECG Click View Image link to view waveform and result      B/P: 114/77, T: 98.3, P: 105, R: 17    Impression:   Octavio Loya is a 48-year-old male with a history of schizophrenia that has been managed with medications to include high-dose Clozaril and incorporation of anticonvulsant levetiracetam for seizure prophylaxis given the high-dose.  The patient was admitted to the hospital with concerns of altered mental status and gait instability, thought secondary to inadvertently taking his medications incorrectly over the past month.  I attempted to contact his  for further details with respect to his history and the informing events leading to the hospitalization, however, she was not available when I called.  At this time, I agree with continuing to hold the Clozaril and levetiracetam.  It appears he is mounting an immune response which  is reassuring.  Whether the current challenges are informed by medication missed administration or poor tolerance is not fully clear, though the infection certainly adds another variable.  I have no aversion to resuming Clozaril in the future, though this will have to be started from scratch as he will be off the medication for over 48 hours once it is resumed.  I would not resume it until current potential confounding variables such as infection have been treated and his mental status has normalized for a period of 24 hours.  The reinitiation of Clozaril will require 12.5 mg b.i.d. dosing after which it will be assessed how he tolerates that for further increases.  With respect to incorporating an anticonvulsant this is typically done when serum levels are consistently over 500.  I would thus refrain from reinitiating an anticonvulsant until that becomes irrelevant again.  He may not require such high doses of Clozaril and I would not blindly return to those and would instead make that titration in the outpatient setting based on his progress and response.  One consideration as an alternative to Keppra; however, would be lamotrigine for the anticonvulsant.  It tends to be much better tolerated from a psychiatric perspective.     5/11/19: Patient recovering in ICU after acute surgical management of gangrenous cholecystitis with e coli bacteremia and consequent sepsis. Continue to hold Clozaril and Keppra.      DIagnoses:   1. Gangrenous cholecystitis with e coli bacteremia and consequent sepsis  2. s/p Lap cholecystectomy 5/10/19  3. Schizophrenia by history         Plan:   1.  Continue to hold anticonvulsant and Clozaril.   2.  Clozaril level not yet available.   3.  Continue medical management as you are.           Attestation:  Patient has been seen and evaluated by me,  Alin Smith DO

## 2019-05-11 NOTE — PLAN OF CARE
2015: From PACU.  Pt became irritable, pulling at lines and equipment with transfer and cares.  Calmed when cares were completed.  Answered questions and followed commands inconsistently.  Sitter at bedside. VSS.  2200:  No change in assessment. VSS.  Sitter at bedside.

## 2019-05-11 NOTE — PROGRESS NOTES
General Surgery Progress Note    Admission Date: 5/9/2019  Today's Date: 5/11/2019         Assessment:      Octavio Loya is a 48 year old male with a history of schizophrenia admitted 5/9 with confusion found to have cholecystitis. Underwent lap blaine 5/10 for gangrenous and purulent cholecystitis         Plan:   - Transfer out of ICU today if cleared by hospitalist  - Try clear liquids once patient more awake/alert  - Continue IVFs  - Wean O2 as tolerated. Encourage IS use  - Dilaudid and valium available for pain, not needing much. Do not give Toradol. Transition to PO meds when appropriate  - Continue Zosyn for intra-abdominal infection (Gram stain with moderate GNRs, follow culture results)  - Recheck labs tomorrow  - PCDs. Will start lovenox today for dvt ppx given patient's risk with abdominal surgery, infection, and lack of mobility. Monitor platelets closely  - Receiving Pepcid for stress ulcer ppx (also is on zantac at home). Will change to PO omeprazole given thrombocytopenia  - PT and OT consulted    Dispo: Improving. out of ICU today. Expect several days in hospital          Interval History:   Tmax 102.5 around 1800, afebrile this morning. Heart rate 90s-low 100s. Blood pressures good. Intermittently needing O2, currently on 2L via NC. Drain with 200cc out since surgery. Good UOP.  WBC and LFTs normal, hgb stable, renal function normal. Answers questions but fairly confused. Complains of some abdominal pain at times. Rests comfortably. Has not taken any PO yet.          Physical Exam:   /77   Pulse 105   Temp 98.4  F (36.9  C) (Axillary)   Resp 17   Wt 74.6 kg (164 lb 7.4 oz)   SpO2 100%   BMI 24.29 kg/m    I/O last 3 completed shifts:  In: 4597 [P.O.:300; I.V.:3797]  Out: 1944 [Urine:1445; Drains:199; Blood:300]  General: NAD, resting comfortably in bed. Confused about place and situation  Respiratory: non-labored breathing. O2 cannula in place  Abdomen: soft, mild appropriate tenderness.  Minimally distended. Hypoactive bowel sounds. JOAQUÍN drain in place with brownish fluid in bulb - brown color from hemostatic agent used at time of surgery. Drain stripped  Incisions: clean, dry, and intact with dressings in place  Extremities: no LE edema, does not endorse calf tenderness. Wearing PCDs    LABS:  Recent Labs   Lab Test 05/11/19  0509 05/10/19  1643 05/10/19  1634 05/10/19  0630   WBC 6.3  --  7.7 8.8   HGB 9.1* 7.8* 9.1* 11.0*   MCV 87  --  86 87   PLT 84*  --  77* 98*     Recent Labs   Lab Test 05/11/19  0509 05/10/19  1643 05/10/19  1634 05/10/19  0630 05/09/19  0815   POTASSIUM 3.7 3.9 3.8 3.7 3.3*   CHLORIDE 112*  --  112* 113* 100   CO2 25  --  23 21 20   BUN 8  --  10 14 18   CR 0.76  --  0.87 1.05 1.37*   ANIONGAP 6  --  6  --  12      Recent Labs   Lab Test 05/11/19  0509 05/09/19  0815   ALBUMIN 2.0* 3.2*   BILITOTAL 0.5 1.1   ALT 24 18   AST 22 12   ALKPHOS 105 140              Catie Tucker PA-C  [facility]: [phone]  Pager: [phone] (7am-4pm)

## 2019-05-11 NOTE — PROGRESS NOTES
[facility]    Medicine Progress Note - Hospitalist Service       Date of Admission:  5/9/2019  Assessment & Plan   Octavio Loya is a 48 year-old male admitted on 5/9/2019.      Severe sepsis secondary to acute gangrenous cholecystitis with E coli bacteremia  Status post laparoscopic cholecystectomy, 5/10/19  Presented with confusion and gait difficulty as below. Afebrile though white blood cell count and lactic acid were elevated. Preliminary infectious work-up negative with normal chest x-ray, normal UA, LP with 4 WBC. Blood culture from admission positive with E coli. Underwent CT abd/pelvis with marked inflammation in gallbladder. Underwent above surgery 5/10.   - General surgery following, appreciate assistance  - Post-operative cares per general surgery service   - E coli pan-sensitive  - Currently on piperacillin-tazobactam IV, anticipate given source control with cholecystectomy can be treated with eventual oral therapy once clinically improved and diet advanced  - Monitor fever curve  - WBC normal  - Remove arterial line    Acute blood loss anemia  Expected from above surgery. Admission hemoglobin 12.5 g/dl (though likely concentrated to a degree).  ml.  Recent Labs   Lab 05/11/19  0509 05/10/19  1643 05/10/19  1634 05/10/19  0630 05/09/19  0815   HGB 9.1* 7.8* 9.1* 11.0* 12.5*     - Monitor hemoglobin     Left-sided chest pain  EKG with sinus tachycardia without ischemic changes. Reports dyspnea as well, though his history is difficult due to encephalopathy. Multiple potential etiologies to tachycardia including pain, infection, withdrawal from psychiatric medications.   - Bilateral lower extremity US to r/o DVT, if negative and symptoms persist may consider CT PE protocol    Acute metabolic encephalopathy   Presents after his friend reported that while waiting for the bus for work this morning that the patient seemed more confused and had unsteadiness that was more than usual, and  so his friend brought him in for further evaluation. He reportedly takes his medication on his own at the group home (not staffed full time) and has a history of taking medications incorrectly in the past. Patient without known seizure disorder history, maintained on levetiracetam for prophylaxis due to high doses of clozapine. He has not had any witnessed seizure activity reported. Sepsis/infection likely significant contributor to encephalopathy, though medication effect either in conjunction with infection or with infection leading to incorrect doses taking by patient remaining a possibility. EEG with diffuse changes consistent with encephalopathy, no epileptiform changes   - Remains encephalopathic   - Psychiatry consulted, see below  - Re-orient as needed  - Maintain normal day/night, sleep wake cycles  - Minimize sedating/altering medications as able  - Treat separate conditions as detailed above/below  - PT/OT to assess if appropriate to return to group home    Schizophrenia  Anxiety  Prior to admission on high dose clozapine (300 mg in evening, 500 mg at bedtime) and prophylactic levetiracetam as a result of high dose. Also on propranolol, sertraline, buspirone, clonazepam.   - Psychiatry consulted on admission, off of PTA regimen for now  - Psychiatry re-consulted for 5/11/19 to continue following for appropriate medication re-initiation, plan for daily psychiatry consults   - Resume prior to admission propranolol to avoid withdrawal, though will start at 10 mg TID until BP confirmed stable    Elevated lactic acid   Recheck resolved     Acute kidney injury   Mild hyponatremia  Creatinine 1.37 on admission. Most recent creatinine available in [facility] 1.15 9/2017.   - Creatinine normalized   - Continue IVF     Mild hypokalemia   - Potassium replacement protocol     Stress hyperglycemia  Hemoglobin A1c normal at 4.9%  - Treat infection as above     Thrombocytopenia  Baseline platelet count in low 100s.  "Decrease likely due to sepsis/bacteremia. No signs of bleeding.  - Monitor CBC       Diet: Clear Liquid Diet    DVT Prophylaxis: Enoxaparin subcutaneous per general surgery team   Schulz Catheter: in place, indication: Strict 1-2 Hour I&O  Code Status: Full Code      Disposition Plan   Expected discharge: Anticipate several days of hospitalization for post-operative cares and eventual re-titration of psychiatric regimen. PT/OT consulted to determine if appropriate to return to group home versus TCU. Transfer from ICU to surgical floor.  Entered: Ravi Hammonds MD 05/11/2019, 9:03 AM       The patient's care was discussed with the Bedside Nurse and Patient.    Ravi Hammonds MD  Hospitalist Service  [facility]    ______________________________________________________________________    Interval History   Underwent laparoscopic cholecystectomy 5/10/19. Was admitted to ICU afterwards due to requiring phenylephrine during case. This morning remains encephalopathic. C/o sharp left-sided chest pain. Associated with shortness of breath. Reports his chest pain is \"usual stuff\" with clozapine being hard on the heart. Denies any calf pain. Complained to nurse about bilateral numbness/tingling, though on re-questioning denied any of this. Reliable history is overall difficult to obtain from patient given his ongoing encephalopathy.      Data reviewed today: I reviewed all medications, new labs and imaging results over the last 24 hours. I personally reviewed no images or EKG's today.    Physical Exam   Vital Signs: Temp: 98.3  F (36.8  C) Temp src: Axillary BP: 114/77 Pulse: 105 Heart Rate: 104 Resp: 17 SpO2: 100 % O2 Device: Nasal cannula Oxygen Delivery: 2 LPM  Weight: 164 lbs 7.41 oz    Constitutional:  NAD  Respiratory: Clear to auscultation bilaterally, good air movement bilaterally  Cardiovascular: Tachycardic with regular rhythm, no m/r/g. No peripheral edema.  GI: Soft, appropriately tender, " non-distended.   Skin/Integumen: Warm, dry  MSK: Calves soft, non-tender bilaterally  Neuro: Alert. Oriented to self only. Not able to state location, date or reason for hospitalization. Face symmetric. Tongue midline. 5/5 upper and lower extremity strength symmetric bilaterally. PERRL, EOMI.      Data   Recent Labs   Lab 05/11/19  0509 05/10/19  1643 05/10/19  1634 05/10/19  0630 05/09/19  0815   WBC 6.3  --  7.7 8.8 20.3*   HGB 9.1* 7.8* 9.1* 11.0* 12.5*   MCV 87  --  86 87 86   PLT 84*  --  77* 98* 114*   INR  --   --  1.36*  --   --     142 141 144 132*   POTASSIUM 3.7 3.9 3.8 3.7 3.3*   CHLORIDE 112*  --  112* 113* 100   CO2 25  --  23 21 20   BUN 8  --  10 14 18   CR 0.76  --  0.87 1.05 1.37*   ANIONGAP 6  --  6  --  12   VIJAY 7.9*  --  7.8* 8.2* 9.1   GLC 84  --  99 92 246*   ALBUMIN 2.0*  --   --   --  3.2*   PROTTOTAL 5.0*  --   --   --  7.7   BILITOTAL 0.5  --   --   --  1.1   ALKPHOS 105  --   --   --  140   ALT 24  --   --   --  18   AST 22  --   --   --  12       Recent Results (from the past 24 hour(s))   CT Abdomen Pelvis w Contrast    Narrative    CT ABDOMEN AND PELVIS WITH CONTRAST 5/10/2019 11:12 AM     HISTORY: Abdominal pain, diverticulitis suspected. E coli bacteremia,  left lower quadrant tenderness, leukocytosis.    COMPARISON: None.    TECHNIQUE: Volumetric helical acquisition of CT images from the lung  bases through the symphysis pubis after the administration of 73 mL  Isovue-370  intravenous contrast. Radiation dose for this scan was  reduced using automated exposure control, adjustment of the mA and/or  kV according to patient size, or iterative reconstruction technique.    FINDINGS: Severe inflammatory change and wall thickening of the  gallbladder compatible with severe cholecystitis. Marked splenomegaly  at 18.2 cm. Liver, adrenal glands, and pancreas demonstrate no  worrisome focal lesion. No bowel obstruction. Moderate to large amount  of left-sided stool. Unremarkable  appendix. There are no dilated loops  of small intestine or large bowel to suggest ileus or obstruction. No  diverticulitis. Small amount of free fluid. No free intraperitoneal  air. Normal caliber aorta. No hydronephrosis. Survey of the visualized  bony structures demonstrates no destructive bony lesions. The  visualized lung bases are unremarkable.      Impression    IMPRESSION:  1. Marked gallbladder wall thickening and marked surrounding  inflammatory change compatible with acute cholecystitis.  2. Splenomegaly at 18.1 cm.  3. Small amount of pelvic free fluid.  4. Moderate to large amount of left-sided stool.    Results called to Dr. Hammonds on May 10, 2019 at 1:31 PM.    ROB COSBY MD     Medications     lactated ringers Stopped (05/10/19 0111)       enoxaparin  40 mg Subcutaneous Q24H     omeprazole  40 mg Oral QAM AC     piperacillin-tazobactam  3.375 g Intravenous Q6H

## 2019-05-11 NOTE — PLAN OF CARE
OT: Eval complete and Tx initiated. Pt admitted for encephalopathy and cholecystitis. Seen POD #1 of cholecystectomy. PMH of schizophrenia. Prior to admit, pt lives in group home with 6 other individuals and reports mod I-I with ADLs, laundry, meal prep, and work. Unclear accuracy of PLOF and home set up due to pt's current cognitive status.     Discharge Planner OT   Patient plan for discharge: Unclear at this time    Current status: Pt required min A for functional transfers. Pt completed seated G/H tasks with set up and SBA.     Barriers to return to prior living situation: Stairs within home; Fall risk; Safety concerns; Bathtub    Recommendations for discharge: TCU    Rationale for recommendations: Pt limited by impaired cognition and safety, decreased balance and activity tolerance, and pain; thus, OT will continue with daily intervention to progress I and safety with ADLs. If group home is able to provide 24 hr supervision for A with all ADLs and transfers, pt may return there with home OT.        Entered by: Emily Barboza 05/11/2019 3:01 PM

## 2019-05-11 NOTE — PLAN OF CARE
"PT: Orders received, chart reviewed, spoke with RN who was present during attempt to initiate PT with patient. Pt stating he needs \"one more  healing day\" prior to initiating activity. Pt continuing to decline participation despite education and encouragement.  "

## 2019-05-11 NOTE — PROGRESS NOTES
Pt C/O numbness/ tingling bilateral. Has a forced gaze to left, Pupils Sluggish. Now C/O sharp stabbing Lt chest pain. Provider notified, Stat EKG ordered      Update 1119.. US preformed of LEs, see note. Pt more confused, restless and anxious. Frequent redirection needed. Pupils still sluggish.  UE tremors noted. Still C/O bilateral numbness . Contacted Psych for possible S/Sy of withdrawal, continue to monitor at this time and no new orders. JOAQUÍN drain patent. Abdominal incisions CDI, Abdomen distended and tender, BS hypoactive. Transfer to medical floor likely today. Nursing to follow closely     Update 6773.. Pt remains confused, Alert but oriented x2 at best. Forgetful.  Illogical speech. Tremors continue. Temp 101.9 max. Tele Sinus Tach and continued 120s.. Abdomen tender and distended, BS+ but hypoactive, Gas+. JOAQUÍN patent, Schulz patent .. Poor PO intake. Up with OT at bedside and tolerated well. Pt remians impulsive and needs frequent reorientation, continually pulling at lines/tubes. Very pleasant... Suspected transfer to  pending this afternoon

## 2019-05-11 NOTE — CONSULTS
ICU staff - brief note  DOS 5/10/2019    Mr Loya arrives to the ICU following a laparoscopic cholecystectomy for monitoring. He had gangrenous cholecystitis with E coli bacteremia and was initially admitted with altered mental status and sepsis. ICU placement had been requested intraoperatively as he required some phenylephrine during the case and there was a question about whether he would be able to extubate postoperatively. He came off pressors intra-op and was extubated in PACU.    At my evaluation on his arrival, he is sleeping, with HR 90s-100s, BP 130s/60s, RR 14-18 and saturating  on 3LNC. His pulse is regular, abdomen is soft, and extremities well-perfused.    Discussed with Dr Lee of the hospitalist service -- the patient appears to be doing well postop and has no critical care issues. We agreed to defer formal ICU consult for now -- please do not hesitate to contact the intensivists if we can be of assistance.    Non-billable encounter.    Sylvain Das MD, PhD  Surgical critical care  May 10, 2019, 9:49 PM

## 2019-05-11 NOTE — ANESTHESIA POSTPROCEDURE EVALUATION
Patient: Octavio Loya    Procedure(s):  CHOLECYSTECTOMY, LAPAROSCOPIC    Diagnosis:unknown  Diagnosis Additional Information: No value filed.    Anesthesia Type:  General, RSI, ETT    Note:  Anesthesia Post Evaluation    Patient location during evaluation: PACU  Patient participation: Able to fully participate in evaluation  Level of consciousness: awake  Pain management: adequate  Airway patency: patent  Cardiovascular status: acceptable  Respiratory status: acceptable  Hydration status: acceptable  PONV: none     Anesthetic complications: None    Comments: Ok to be transferred to the ICU;        Last vitals:  Vitals:    05/10/19 1950 05/10/19 2000 05/10/19 2010   BP: 130/85 125/82 127/84   Pulse: 107 109 110   Resp: 17 22 23   Temp:   38.4  C (101.2  F)   SpO2: 97% 96% 96%         Electronically Signed By: VICKI RIVERA MD  May 10, 2019  8:57 PM

## 2019-05-11 NOTE — OP NOTE
General Surgery Operative Note    PREOPERATIVE DIAGNOSIS:  cholecystitis.    POSTOPERATIVE DIAGNOSIS: gangrenous purulent cholecystitis, modifier 22 for difficulty given degree of inlammation    PROCEDURE:  Laparoscopic Cholecystectomy    SURGEON:  Vannessa Mejia MD    ASSISTANT:  Catie Tucker PA-C  The physician s assistant was medically necessary for their expertise in camera management, suctioning and retraction.  ANESTHESIA:  General.    BLOOD LOSS: 300ml    FINDINGS: significant inflammation surrounding gallbladder with stomach adherent to gallbladder and omentum caking across dome. Able to eventually clear gallbladder, decompression performed, GNR seen on gram stain. Stapled across infundibulum after dome down approach.     INDICATIONS:   Mr. Loya is a 49 yo male with a history of schizophrenia who presented with confusion on 5/9. Workup for encephalopathy took place. Pt was found to have GNR bacteremia and an abdominal CT was performed that revealed significant inflammation and distention of the gallbladder consistent with cholecystitis. This was declared an emergent procedure as pt was febrile, mildly hypotensive, tachycardic and diaphoretic.    I explained the risks, benefits, complications for laparoscopic cholecystectomy including but not limited to bleeding, infection, possible need to open, possible postop hematoma, seroma, bowel, bladder or bile duct injury, MI, PE, and if any of these occurred the patient would require additional procedures. The patient agreed and did sign consent.    DETAILS OF PROCEDURE:   The patient was brought to the operating room. They was positioned on the operating room table with the left arm tucked at his side. General anesthesia was induced. Perioperative antibiotics were administered. The abdomen was prepped and draped in standard fashion.    A small skin incision was made in the left upper quadrant. A 5mm 0degree laparoscope was used with a visiport to obtain  access to the abdomen. Insufflation was connected and flowed freely. Insufflation pressure was sent to 15mmhg. The abdomen was surveyed and it was noted that there was omentum obscuring the view of the gallbladder in the right upper abdomen and that the stomach was adherent to the undersurface of the gallbladder. There was no injury from abdominal entrance noted. A 12mm port was placed in the midline just above the umbilicus. A 10mm laparoscope was inserted and revealed no trocar injuries. Local was injected to the upper abdomen and two 5mm ports were placed in the right upper quadrant under direct visualization.The gallbladder was not visible due to omentum covering the anterior surface. With careful dissection I was eventually able to sweep the omentum free from the undersurface of the gallbladder, in doing so I encountered several pockets of purulent fluid which were aspirated. I was able to carefully sweep the stomach free from the adhesions to the medial aspect of the undersurface of the gallbladder. There was significant inflammation in the infundibulum and I was not making progress with dissection due to this. In order to grasp the gallbladder, 60ml of purulent fluid was aspirated from the gallbladder using a laparoscopic needle aspirator. The gallbladder was then able to be grasped and retracted. I again attempted careful dissection in the infundibulum and there was significant inlammation and dense adhesions to surrounding tissue present.  I then elected to perform a dome down technique. I was able to enter the plane of inflammation posterior to the gallbladder and this was swept clear using the suction  device. There were few dense bands which were divided with cautery. Once this dissection was carried down to the infundibulum, I elected to proceed with stapling across the infundibulum as the safest option. The left upper quadrant port was upsized to a 12mm port. I then used a 60mm blue echelon  articulating stapler to divide the gallbladder. The tissue was thickened but I was able to easily staple across. The staple line was intact across the infundibulum. The gallbladder was then placed in an Endocatch bag and removed through the left upper quadrant port gsite. The camera was reinserted which showed minimal ongoing bleeding.  Copious irrigation was used until clear. The wound bed was inspected multiple times showing that it was completely dry. Surgicel powder was placed in the gallbladder fossa for added reasurance. A 15 Divehi round drain was placed in gallbladder fossa via the lateral most port site. It was secured to the skin with a 3-0 nylon suture.  The omentum was packed into the perihepatic fossa. The 12mm port fascia closed with 0 Vicryl in figure-of-eight fashion using the pavan hector device. All gas was expelled and the trocars were taken out under direct visualization. Marcaine 0.5% were injected to all the wounds. The skin was closed with a 4-0 Monocryl in a subcuticular fashion. Steri-strips and sterile dressings were applied. The patient tolerated the procedure well. There were no complications. They were awoken from anesthesia and transferred to PACU in stable condition. All sponge, instrument and needle counts were correct.       JOSE NOVA MD    Please route or send letter to:  Primary Care Provider (PCP) and Referring Provider

## 2019-05-11 NOTE — OR NURSING
1840 Dr Davis at bedside. Pt not responsive at this time.  1900 pt moving extremities slightly, moans when turning him.  1920 Sitter for upstairs at the bedside.  Pt responding slowly and more often. Says birthday, says yes when asked if he is in hospital, doesn't know why.  1940 Awaiting for ICU to call for report

## 2019-05-12 ENCOUNTER — APPOINTMENT (OUTPATIENT)
Dept: PHYSICAL THERAPY | Facility: CLINIC | Age: 49
End: 2019-05-12
Attending: INTERNAL MEDICINE
Payer: COMMERCIAL

## 2019-05-12 ENCOUNTER — APPOINTMENT (OUTPATIENT)
Dept: OCCUPATIONAL THERAPY | Facility: CLINIC | Age: 49
End: 2019-05-12
Payer: COMMERCIAL

## 2019-05-12 LAB
ANION GAP SERPL CALCULATED.3IONS-SCNC: 3 MMOL/L (ref 3–14)
BACTERIA SPEC CULT: ABNORMAL
BUN SERPL-MCNC: 4 MG/DL (ref 7–30)
CALCIUM SERPL-MCNC: 8.1 MG/DL (ref 8.5–10.1)
CHLORIDE SERPL-SCNC: 110 MMOL/L (ref 94–109)
CO2 SERPL-SCNC: 29 MMOL/L (ref 20–32)
CREAT SERPL-MCNC: 0.88 MG/DL (ref 0.66–1.25)
ERYTHROCYTE [DISTWIDTH] IN BLOOD BY AUTOMATED COUNT: 14.1 % (ref 10–15)
GFR SERPL CREATININE-BSD FRML MDRD: >90 ML/MIN/{1.73_M2}
GLUCOSE SERPL-MCNC: 131 MG/DL (ref 70–99)
HCT VFR BLD AUTO: 27.6 % (ref 40–53)
HGB BLD-MCNC: 9.3 G/DL (ref 13.3–17.7)
Lab: ABNORMAL
MCH RBC QN AUTO: 29.3 PG (ref 26.5–33)
MCHC RBC AUTO-ENTMCNC: 33.7 G/DL (ref 31.5–36.5)
MCV RBC AUTO: 87 FL (ref 78–100)
PLATELET # BLD AUTO: 115 10E9/L (ref 150–450)
POTASSIUM SERPL-SCNC: 3.6 MMOL/L (ref 3.4–5.3)
RBC # BLD AUTO: 3.17 10E12/L (ref 4.4–5.9)
SODIUM SERPL-SCNC: 142 MMOL/L (ref 133–144)
SPECIMEN SOURCE: ABNORMAL
WBC # BLD AUTO: 6.2 10E9/L (ref 4–11)

## 2019-05-12 PROCEDURE — 25000132 ZZH RX MED GY IP 250 OP 250 PS 637: Performed by: INTERNAL MEDICINE

## 2019-05-12 PROCEDURE — 25000132 ZZH RX MED GY IP 250 OP 250 PS 637: Performed by: PHYSICIAN ASSISTANT

## 2019-05-12 PROCEDURE — 97535 SELF CARE MNGMENT TRAINING: CPT | Mod: GO

## 2019-05-12 PROCEDURE — 12000000 ZZH R&B MED SURG/OB

## 2019-05-12 PROCEDURE — 80048 BASIC METABOLIC PNL TOTAL CA: CPT | Performed by: INTERNAL MEDICINE

## 2019-05-12 PROCEDURE — 97110 THERAPEUTIC EXERCISES: CPT | Mod: GP

## 2019-05-12 PROCEDURE — 85027 COMPLETE CBC AUTOMATED: CPT | Performed by: INTERNAL MEDICINE

## 2019-05-12 PROCEDURE — 97161 PT EVAL LOW COMPLEX 20 MIN: CPT | Mod: GP

## 2019-05-12 PROCEDURE — 97116 GAIT TRAINING THERAPY: CPT | Mod: GP

## 2019-05-12 PROCEDURE — 25000128 H RX IP 250 OP 636: Performed by: INTERNAL MEDICINE

## 2019-05-12 PROCEDURE — 99232 SBSQ HOSP IP/OBS MODERATE 35: CPT | Performed by: INTERNAL MEDICINE

## 2019-05-12 PROCEDURE — 25000128 H RX IP 250 OP 636: Performed by: SURGERY

## 2019-05-12 PROCEDURE — 36415 COLL VENOUS BLD VENIPUNCTURE: CPT | Performed by: INTERNAL MEDICINE

## 2019-05-12 PROCEDURE — 25800025 ZZH RX 258: Performed by: INTERNAL MEDICINE

## 2019-05-12 RX ORDER — HYDROCODONE BITARTRATE AND ACETAMINOPHEN 5; 325 MG/1; MG/1
1-2 TABLET ORAL EVERY 4 HOURS PRN
Status: DISCONTINUED | OUTPATIENT
Start: 2019-05-12 | End: 2019-05-15 | Stop reason: HOSPADM

## 2019-05-12 RX ORDER — AMOXICILLIN 250 MG
2 CAPSULE ORAL 2 TIMES DAILY
Status: DISCONTINUED | OUTPATIENT
Start: 2019-05-12 | End: 2019-05-15 | Stop reason: HOSPADM

## 2019-05-12 RX ORDER — AMOXICILLIN 250 MG
1 CAPSULE ORAL 2 TIMES DAILY
Status: DISCONTINUED | OUTPATIENT
Start: 2019-05-12 | End: 2019-05-15 | Stop reason: HOSPADM

## 2019-05-12 RX ADMIN — PIPERACILLIN SODIUM,TAZOBACTAM SODIUM 3.38 G: 3; .375 INJECTION, POWDER, FOR SOLUTION INTRAVENOUS at 10:36

## 2019-05-12 RX ADMIN — ENOXAPARIN SODIUM 40 MG: 40 INJECTION SUBCUTANEOUS at 10:36

## 2019-05-12 RX ADMIN — PROPRANOLOL HYDROCHLORIDE 10 MG: 10 TABLET ORAL at 17:12

## 2019-05-12 RX ADMIN — DEXTROSE MONOHYDRATE, SODIUM CHLORIDE, SODIUM LACTATE, POTASSIUM CHLORIDE, CALCIUM CHLORIDE: 5; 600; 310; 179; 20 INJECTION, SOLUTION INTRAVENOUS at 03:07

## 2019-05-12 RX ADMIN — PIPERACILLIN SODIUM,TAZOBACTAM SODIUM 3.38 G: 3; .375 INJECTION, POWDER, FOR SOLUTION INTRAVENOUS at 17:12

## 2019-05-12 RX ADMIN — PROPRANOLOL HYDROCHLORIDE 10 MG: 10 TABLET ORAL at 21:58

## 2019-05-12 RX ADMIN — PROPRANOLOL HYDROCHLORIDE 10 MG: 10 TABLET ORAL at 10:35

## 2019-05-12 RX ADMIN — PIPERACILLIN SODIUM,TAZOBACTAM SODIUM 3.38 G: 3; .375 INJECTION, POWDER, FOR SOLUTION INTRAVENOUS at 21:57

## 2019-05-12 RX ADMIN — DEXTROSE MONOHYDRATE, SODIUM CHLORIDE, SODIUM LACTATE, POTASSIUM CHLORIDE, CALCIUM CHLORIDE: 5; 600; 310; 179; 20 INJECTION, SOLUTION INTRAVENOUS at 15:11

## 2019-05-12 RX ADMIN — OMEPRAZOLE 40 MG: 20 CAPSULE, DELAYED RELEASE ORAL at 06:41

## 2019-05-12 RX ADMIN — ACETAMINOPHEN 650 MG: 325 TABLET, FILM COATED ORAL at 20:35

## 2019-05-12 RX ADMIN — SENNOSIDES AND DOCUSATE SODIUM 2 TABLET: 8.6; 5 TABLET ORAL at 10:35

## 2019-05-12 RX ADMIN — PIPERACILLIN SODIUM,TAZOBACTAM SODIUM 3.38 G: 3; .375 INJECTION, POWDER, FOR SOLUTION INTRAVENOUS at 03:41

## 2019-05-12 ASSESSMENT — ACTIVITIES OF DAILY LIVING (ADL)
ADLS_ACUITY_SCORE: 14

## 2019-05-12 NOTE — PROGRESS NOTES
General Surgery Progress Note    Admission Date: 5/9/2019  Today's Date: 5/12/2019         Assessment:      Octavio Loya is a 48 year old male with a history of schizophrenia admitted 5/9 with confusion found to have cholecystitis. Underwent lap blaine 5/10 for gangrenous and purulent cholecystitis         Plan:   - Regular diet  - Remove yousif when OK per medicine  - Continue IV fluids. Monitor fever curve and heart rate  - Continue JOAQUÍN drain, non-bilious. Strip q8 hours. Monitor output  - Continue Zosyn. Cultures with E coli  - Start senna-docusate bid for bowel regimen. Miralax and dulcolax available prn  - Transition to PO pain meds today as able. IV available for breakthrough  - AM labs pending  - Lovenox for dvt ppx, monitor platelets (still low but stable). PCDs. Continue PO omeprazole  - PT and OT to see patient. Encourage activity    Dispo: continue to monitor, expect a few more days in hospital        Interval History:   Tmax 101.9, tachycardic. Tolerating clear liquids, no complaints of nausea, no vomiting. Feels hungry for more food. Good UOP via yousif. Confused at times. Passing flatus, no BM yet. Pain seems controlled with current IV meds. Drain output increased - 390cc x 24 hours. Pain has been up moving a bit more with nursing and therapies.          Physical Exam:   /80 (BP Location: Left arm)   Pulse 110   Temp 98.6  F (37  C) (Oral)   Resp 20   Wt 74.6 kg (164 lb 7.4 oz)   SpO2 92%   BMI 24.29 kg/m    I/O last 3 completed shifts:  In: 1460 [P.O.:660; I.V.:800]  Out: 2800 [Urine:2410; Drains:390]  General: NAD, pleasant, alert and awake. Answers questions appropriately.   Respiratory: non-labored breathing   Abdomen: somewhat distended but mostly soft. Tender to palpation throughout. Dressings in place except for one lap site where steri strip was removed. JOAQUÍN drain with serosanguinous fluid in bulb, slight cloudiness due to hemostatic agent, no bile.  Extremities: no LE edema, no calf  tenderness. Reports chronic left foot pain    LABS:  - CBC and BMP pending  - Lactate 0.9 (5/11)    Culture of bile: heavy growth probable E coli    --------------------------      Catie Tucker PA-C  [facility]: [phone]  Pager: [phone] (7am-4pm)

## 2019-05-12 NOTE — PLAN OF CARE
"Discharge Planner OT   Patient plan for discharge: did not state (confused)  Current status: pt seen in AM, up at EOB eating breakfast upon arrival indep. Noted pt did not have dentures in therefore retrieved them and as no adhesive avail ordered up for CS however pt did not wait to finish eating. Adhesive did arrive to floor and OT informed nurse and NA to have him insert especially for meals.  Pt cooperative, follows all 1-step directives, is disoriented to date, place, state (knows he lives in [facility] but surprised to learn he is in [place]) or reason for hospitalization. May be baseline cognitive function. Had sudden urge for BM and performed sit-stand CGA, amb to bathroom without adaptive device Min A of 1, performed stand to sit onto low toilet stool safely with CGA and able to manage his gown and robe in prep for toileting.   Barriers to return to prior living situation: stairs, bathtub, current level of A  Recommendations for discharge: TCUY  Rationale for recommendations:  Pt limited by impaired cognition and safety, decreased balance and activity tolerance, and pain; thus, OT will continue with daily intervention to progress I and safety with ADLs. If group home is able to provide 24 hr supervision for A with all ADLs and transfers, pt may return there with home OT.          Entered by: Mc Eric 05/12/2019 10:31 AM      "

## 2019-05-12 NOTE — PLAN OF CARE
Pt is alert but d/o to situation and time, VSS on RA, Tele SR, denies pain, IVF + abx. Up SBA, sitter at bedside. BS hypoactive, +flatus, yousif patent, AUO. Tolerating clear diet. D/C pending.

## 2019-05-12 NOTE — PROGRESS NOTES
[facility]    Medicine Progress Note - Hospitalist Service       Date of Admission:  5/9/2019  Assessment & Plan   Octavio Loya is a 48 year old male admitted on 5/9/2019. He presented with confusion    1: Sepsis due to acute gangrenous cholecystitis with E coli bacteremia, s/p Lap blaine and doing well  2: Acute blood loss anemia, due to surgery and stable.   3: Acute metabolic encephalopathy, likely resolved. He has a positive review of systems which is inconsistent and likely has something to do with his Schizophrenia and Anxiety (SchizoAffective disorder???).   4: History of using Levetiracetam for seizure prophylaxis given high doses of Clozapine for his Schizophrenia   5: Mild acute kidney injury and hyponatremia, both resolved  6: History of hypokalemia, resolved  7: Mild hyperglycemia, due to stress. This AM glucose was 131, will monitor  8: Thrombocytopenia, could be stress related but is it related to the Clozapine. Will recheck tomorrow.   Diet: Regular Diet Adult    DVT Prophylaxis: Enoxaparin (Lovenox) SQ  Schulz Catheter: in place, indication: Strict 1-2 Hour I&O  Code Status: Full Code      Disposition Plan   Expected discharge: 2 - 3 days, recommended to prior living arrangement once per surgical team.  Entered: Jamey Vinson MD 05/12/2019, 8:49 AM       The patient's care was discussed with the Patient.    Jamey Vinson MD  Hospitalist Service  [facility]    ______________________________________________________________________    Interval History   Mr. Loya answers yes to all my questions about pain, but this is not consistent. So he may have chest, and head pain.  Limbs are not painful. Says he is short of breath but breathing quietly.  Hungry and breakfast is coming. OK with removing his urinary catheter. Not sure he has or can be up but says that he was promised a shower today.    Data reviewed today: I reviewed all medications, new labs and imaging  results over the last 24 hours. I personally reviewed no images or EKG's today.    Physical Exam   Vital Signs: Temp: 98.6  F (37  C) Temp src: Oral BP: 135/80 Pulse: 110 Heart Rate: 91 Resp: 20 SpO2: 92 % O2 Device: None (Room air)    Weight: 164 lbs 7.41 oz  General Appearance: Pleasant, slow to answer, not agitated  Respiratory: clear to A&P  Cardiovascular: Regular rhythm, normal S1 and S2, no murmurs or S3, no edema, calves not tender. Telemetry strips are NSR  GI: incisions clean, decreased bowel sounds, diffusely tender  Skin: ok, no rashes  Other: Affect a bit slow     Data   Recent Labs   Lab 05/12/19  0830 05/11/19  0509 05/10/19  1643 05/10/19  1634  05/09/19  0815   WBC 6.2 6.3  --  7.7   < > 20.3*   HGB 9.3* 9.1* 7.8* 9.1*   < > 12.5*   MCV 87 87  --  86   < > 86   * 84*  --  77*   < > 114*   INR  --   --   --  1.36*  --   --     143 142 141   < > 132*   POTASSIUM 3.6 3.7 3.9 3.8   < > 3.3*   CHLORIDE 110* 112*  --  112*   < > 100   CO2 29 25  --  23   < > 20   BUN 4* 8  --  10   < > 18   CR 0.88 0.76  --  0.87   < > 1.37*   ANIONGAP 3 6  --  6  --  12   VIJAY 8.1* 7.9*  --  7.8*   < > 9.1   * 84  --  99   < > 246*   ALBUMIN  --  2.0*  --   --   --  3.2*   PROTTOTAL  --  5.0*  --   --   --  7.7   BILITOTAL  --  0.5  --   --   --  1.1   ALKPHOS  --  105  --   --   --  140   ALT  --  24  --   --   --  18   AST  --  22  --   --   --  12    < > = values in this interval not displayed.     Recent Results (from the past 24 hour(s))   US Lower Extremity Venous Duplex Bilateral    Narrative    US LOWER EXTREMITY VENOUS DUPLEX BILATERAL5/11/2019 10:29 AM    HISTORY: Rule out deep venous thrombosis. Left-sided chest pain.    TECHNIQUE:  Venous Doppler US.?Color flow and spectral Doppler with  waveform analysis performed.      Impression    IMPRESSION:  No evidence of lower extremity deep venous thrombosis.    STUART GANT MD

## 2019-05-12 NOTE — PROGRESS NOTES
05/12/19 1100   Quick Adds   Type of Visit Initial PT Evaluation   Living Environment   Lives With facility resident   Living Arrangements group home   Home Accessibility stairs within home   Number of Stairs, Within Home, Primary 10  (down to laundry)   Transportation Anticipated public transportation   Living Environment Comment Patient lives in a group home at baseline with 6 other individuals    Self-Care   Usual Activity Tolerance good   Current Activity Tolerance moderate   Equipment Currently Used at Home none   Functional Level Prior   Ambulation 0-->independent   Transferring 0-->independent   Toileting 0-->independent   Bathing 1-->assistive equipment   Communication 0-->understands/communicates without difficulty   Swallowing 0-->swallows foods/liquids without difficulty   Cognition 0 - no cognition issues reported   Fall history within last six months no   Which of the above functional risks had a recent onset or change? transferring;ambulation   General Information   Onset of Illness/Injury or Date of Surgery - Date 05/09/19   Referring Physician Ravi Hammonds MD   Patient/Family Goals Statement Not stated   Pertinent History of Current Problem (include personal factors and/or comorbidities that impact the POC) Patient admitted on 5/9/19 for Severe sepsis secondary to acute gangrenous cholecystitis with E coli bacteremia; s/p laparoscopic cholecystectomy on 5/10. Patient with PMH of schizophrenia.    Precautions/Limitations fall precautions;abdominal precautions   Weight-Bearing Status - LLE full weight-bearing   Weight-Bearing Status - RLE full weight-bearing   General Observations Patient supine in bed upon arrival of therapist, agreeable to working with PT    Cognitive Status Examination   Orientation person   Level of Consciousness alert   Follows Commands and Answers Questions 100% of the time;able to follow multistep instructions   Pain Assessment   Patient Currently in Pain No  "  Integumentary/Edema   Integumentary/Edema no deficits were identifed   Posture    Posture Forward head position   Range of Motion (ROM)   ROM Comment B LE ROM WNL    Strength   Strength Comments Not formally assessed but at least 3+/5 with functional transfers and gait    Bed Mobility   Bed Mobility Comments Supine>sit with Min A    Transfer Skills   Transfer Comments Sit>stand from EOB with CGA   Gait   Gait Comments Patient ambulated 10 feet with use of IV pole for support and CGA, scissor like gait at times requiring A to support 2/2 to unsteadiness    Balance   Balance Comments Unsteady with ambulation 2/2 to narrow AMI, requring CGA to Min A to support    General Therapy Interventions   Planned Therapy Interventions bed mobility training;balance training;gait training;strengthening;transfer training;home program guidelines   Clinical Impression   Criteria for Skilled Therapeutic Intervention yes, treatment indicated   PT Diagnosis Impaired mobility and gait   Influenced by the following impairments weakness, deconditoning    Functional limitations due to impairments bed mobility, transfers, gait, stairs   Clinical Presentation Stable/Uncomplicated   Clinical Presentation Rationale Based on PMH, current presentation, and social support    Clinical Decision Making (Complexity) Low complexity   Therapy Frequency` daily   Predicted Duration of Therapy Intervention (days/wks) 3 days    Anticipated Discharge Disposition Transitional Care Facility   Risk & Benefits of therapy have been explained Yes   Patient, Family & other staff in agreement with plan of care Yes   [facility] [employer] \"6 Clicks\"   2016, Trustees of [facility], under license to [employer].  All rights reserved.   6 Clicks Short Forms Basic Mobility Inpatient Short Form   [facility] AM-PAC  \"6 Clicks\" V.2 Basic Mobility Inpatient Short Form   1. Turning from your back to your side while in a flat bed without using bedrails? 4 " - None   2. Moving from lying on your back to sitting on the side of a flat bed without using bedrails? 3 - A Little   3. Moving to and from a bed to a chair (including a wheelchair)? 3 - A Little   4. Standing up from a chair using your arms (e.g., wheelchair, or bedside chair)? 3 - A Little   5. To walk in hospital room? 3 - A Little   6. Climbing 3-5 steps with a railing? 3 - A Little   Basic Mobility Raw Score (Score out of 24.Lower scores equate to lower levels of function) 19   Total Evaluation Time   Total Evaluation Time (Minutes) 8

## 2019-05-12 NOTE — PLAN OF CARE
Pt restless, disoriented to time and place. Pulling at lines, needs frequent reorientation. Easily redirected. Sitter at bedside. Tolerating clear liquid diet. Abdomen tender and distended. Lap sites WNL. JOAQUÍN drain with tea colored drainage. Oxycodone for pain. Schulz in place with adequate output.

## 2019-05-13 ENCOUNTER — APPOINTMENT (OUTPATIENT)
Dept: PHYSICAL THERAPY | Facility: CLINIC | Age: 49
End: 2019-05-13
Payer: COMMERCIAL

## 2019-05-13 ENCOUNTER — APPOINTMENT (OUTPATIENT)
Dept: OCCUPATIONAL THERAPY | Facility: CLINIC | Age: 49
End: 2019-05-13
Payer: COMMERCIAL

## 2019-05-13 LAB
ALBUMIN UR-MCNC: 10 MG/DL
ANION GAP SERPL CALCULATED.3IONS-SCNC: 4 MMOL/L (ref 3–14)
APPEARANCE UR: CLEAR
BACTERIA SPEC CULT: ABNORMAL
BACTERIA SPEC CULT: ABNORMAL
BILIRUB UR QL STRIP: NEGATIVE
BUN SERPL-MCNC: 2 MG/DL (ref 7–30)
CALCIUM SERPL-MCNC: 8.1 MG/DL (ref 8.5–10.1)
CHLORIDE SERPL-SCNC: 111 MMOL/L (ref 94–109)
CLOZAPINE AND METABOLITES TOTAL: 3646 NG/ML
CLOZAPINE SERPL-MCNC: 2308 NG/ML
CLOZAPINE-N-OXIDE QUANT: 272 NG/ML
CO2 SERPL-SCNC: 28 MMOL/L (ref 20–32)
COLOR UR AUTO: ABNORMAL
CREAT SERPL-MCNC: 0.76 MG/DL (ref 0.66–1.25)
ERYTHROCYTE [DISTWIDTH] IN BLOOD BY AUTOMATED COUNT: 14.1 % (ref 10–15)
GFR SERPL CREATININE-BSD FRML MDRD: >90 ML/MIN/{1.73_M2}
GLUCOSE SERPL-MCNC: 128 MG/DL (ref 70–99)
GLUCOSE UR STRIP-MCNC: NEGATIVE MG/DL
HCT VFR BLD AUTO: 28.2 % (ref 40–53)
HGB BLD-MCNC: 9.5 G/DL (ref 13.3–17.7)
HGB UR QL STRIP: ABNORMAL
KETONES UR STRIP-MCNC: NEGATIVE MG/DL
LEUKOCYTE ESTERASE UR QL STRIP: NEGATIVE
Lab: ABNORMAL
MCH RBC QN AUTO: 29.5 PG (ref 26.5–33)
MCHC RBC AUTO-ENTMCNC: 33.7 G/DL (ref 31.5–36.5)
MCV RBC AUTO: 88 FL (ref 78–100)
NITRATE UR QL: NEGATIVE
NORCLOZAPINE SERPL-MCNC: 1066 NG/ML
PH UR STRIP: 7 PH (ref 5–7)
PLATELET # BLD AUTO: 141 10E9/L (ref 150–450)
POTASSIUM SERPL-SCNC: 3.5 MMOL/L (ref 3.4–5.3)
RBC # BLD AUTO: 3.22 10E12/L (ref 4.4–5.9)
RBC #/AREA URNS AUTO: >182 /HPF (ref 0–2)
SODIUM SERPL-SCNC: 143 MMOL/L (ref 133–144)
SOURCE: ABNORMAL
SP GR UR STRIP: 1.01 (ref 1–1.03)
SPECIMEN SOURCE: ABNORMAL
UROBILINOGEN UR STRIP-MCNC: NORMAL MG/DL (ref 0–2)
WBC # BLD AUTO: 5.6 10E9/L (ref 4–11)
WBC #/AREA URNS AUTO: 3 /HPF (ref 0–5)

## 2019-05-13 PROCEDURE — 36415 COLL VENOUS BLD VENIPUNCTURE: CPT | Performed by: INTERNAL MEDICINE

## 2019-05-13 PROCEDURE — 25000132 ZZH RX MED GY IP 250 OP 250 PS 637: Performed by: INTERNAL MEDICINE

## 2019-05-13 PROCEDURE — 99207 ZZC CDG-MDM COMPONENT: MEETS LOW - DOWN CODED: CPT | Performed by: INTERNAL MEDICINE

## 2019-05-13 PROCEDURE — 81001 URINALYSIS AUTO W/SCOPE: CPT | Performed by: PHYSICIAN ASSISTANT

## 2019-05-13 PROCEDURE — 25000128 H RX IP 250 OP 636: Performed by: INTERNAL MEDICINE

## 2019-05-13 PROCEDURE — 85027 COMPLETE CBC AUTOMATED: CPT | Performed by: INTERNAL MEDICINE

## 2019-05-13 PROCEDURE — 80048 BASIC METABOLIC PNL TOTAL CA: CPT | Performed by: INTERNAL MEDICINE

## 2019-05-13 PROCEDURE — 12000000 ZZH R&B MED SURG/OB

## 2019-05-13 PROCEDURE — 25000128 H RX IP 250 OP 636: Performed by: SURGERY

## 2019-05-13 PROCEDURE — 97530 THERAPEUTIC ACTIVITIES: CPT | Mod: GO

## 2019-05-13 PROCEDURE — 97116 GAIT TRAINING THERAPY: CPT | Mod: GP

## 2019-05-13 PROCEDURE — 25800025 ZZH RX 258: Performed by: INTERNAL MEDICINE

## 2019-05-13 PROCEDURE — 99232 SBSQ HOSP IP/OBS MODERATE 35: CPT | Performed by: INTERNAL MEDICINE

## 2019-05-13 RX ADMIN — ACETAMINOPHEN 650 MG: 325 TABLET, FILM COATED ORAL at 09:13

## 2019-05-13 RX ADMIN — ENOXAPARIN SODIUM 40 MG: 40 INJECTION SUBCUTANEOUS at 09:13

## 2019-05-13 RX ADMIN — PROPRANOLOL HYDROCHLORIDE 10 MG: 10 TABLET ORAL at 17:28

## 2019-05-13 RX ADMIN — PROPRANOLOL HYDROCHLORIDE 10 MG: 10 TABLET ORAL at 09:13

## 2019-05-13 RX ADMIN — ACETAMINOPHEN 650 MG: 325 TABLET, FILM COATED ORAL at 17:29

## 2019-05-13 RX ADMIN — PROPRANOLOL HYDROCHLORIDE 10 MG: 10 TABLET ORAL at 22:33

## 2019-05-13 RX ADMIN — OMEPRAZOLE 40 MG: 20 CAPSULE, DELAYED RELEASE ORAL at 09:13

## 2019-05-13 RX ADMIN — DEXTROSE MONOHYDRATE, SODIUM CHLORIDE, SODIUM LACTATE, POTASSIUM CHLORIDE, CALCIUM CHLORIDE: 5; 600; 310; 179; 20 INJECTION, SOLUTION INTRAVENOUS at 03:19

## 2019-05-13 RX ADMIN — PIPERACILLIN SODIUM,TAZOBACTAM SODIUM 3.38 G: 3; .375 INJECTION, POWDER, FOR SOLUTION INTRAVENOUS at 09:17

## 2019-05-13 RX ADMIN — PIPERACILLIN SODIUM,TAZOBACTAM SODIUM 3.38 G: 3; .375 INJECTION, POWDER, FOR SOLUTION INTRAVENOUS at 22:33

## 2019-05-13 RX ADMIN — PIPERACILLIN SODIUM,TAZOBACTAM SODIUM 3.38 G: 3; .375 INJECTION, POWDER, FOR SOLUTION INTRAVENOUS at 16:13

## 2019-05-13 RX ADMIN — PIPERACILLIN SODIUM,TAZOBACTAM SODIUM 3.38 G: 3; .375 INJECTION, POWDER, FOR SOLUTION INTRAVENOUS at 03:18

## 2019-05-13 ASSESSMENT — ACTIVITIES OF DAILY LIVING (ADL)
ADLS_ACUITY_SCORE: 14
ADLS_ACUITY_SCORE: 18
ADLS_ACUITY_SCORE: 14
ADLS_ACUITY_SCORE: 18
ADLS_ACUITY_SCORE: 14
ADLS_ACUITY_SCORE: 14

## 2019-05-13 NOTE — PLAN OF CARE
A&O to self only, confused. VS stable on room air. Up with assist x1 with gait belt. Complained of mild abdominal pain with movement, declined pain medication. Lap sites clean, dry, and intact. JOAQUÍN drain on right, dressing clean, dry and intact. Schulz removed today, voiding adequately, some blood in urine noted. Lung sounds diminished. Numbness to bilateral hands and feet, baseline per patient. Discharge plan pending.

## 2019-05-13 NOTE — PLAN OF CARE
VSS. RA. Disoriented to situation. Abd pain managed with tylenol. Frequent/ urgent voiding, impulsive with urgency, incontinent at times. Up Ax1 GB, unsteady. Loose BM x1. JOAQUÍN drain patent, 70cc outpt. Dressing CDI. Tolerating diet. Discharge possible in couple days.

## 2019-05-13 NOTE — PLAN OF CARE
Discharge Planner OT   Patient plan for discharge: did not state     Current status: SBA for bed mobility supine > sit with log roll technique. Pt required CGA for sit <> stand with use of IV pole for UE support. Pt declined all ADL's this date as he already completed. Pt completed functional ambulation in hallway with emphasis on safety and improving activity tolerance to progress IND with ADL's and functional transfers. IND to don/doff socks.     Barriers to return to prior living situation: stairs, bathtub, current level of A    Recommendations for discharge: TCU    Rationale for recommendations: Pt would benefit from continued skilled OT services to improve independence and safety with ADL's and functional transfers as pt is not at baseline. If group home is able to provide 24 hr supervision for A with all ADLs and transfers, pt may return there with home OT.          Entered by: Monse Stevens 05/13/2019 9:49 AM

## 2019-05-13 NOTE — PLAN OF CARE
A&Ox1. Impulsive. VSS ex some low grade temps. Pain reduced with PRN tylenol. Moves well, but wobbly. Abd rounded. JOAQUÍN site CDI. Lap sites CDI. UOP good. Trace amounts of blood with urination, reports some penile pain with urination. Incontinent at times, has urgency. PIV infiltrated. Trace edema to RUE. Up with assist.

## 2019-05-13 NOTE — PROGRESS NOTES
SW:    D: following for discharge planning. CHARISSA spoke with luisa patients [facility] [phone] who states patient resides in a board & lodge facility and is independent in all ADL iADL's. He received mental health support via [facility]. It is not supervised. PT OT recommending TCU, Luisa recommended calling Jared patients brother to discuss discharge plans & get choices. CHARISSA spoke with Jared who states who does not live in the [facility] & his brother Austin would be a better help. CHARISSA left message for Austin to call back & discuss.   P: Await for call back.     Yanet Bethea, LSW    [phone]: CHARISSA called Jared & Austin for TCU choices. Neither answered left message re questing call back.

## 2019-05-13 NOTE — PLAN OF CARE
Alert, partially disoriented to time/situation. VSS. LS clear. BS active, multiple loose stools. Voids frequently/ with urgency. Up standby assist, gait steady, pt forgets to call and at times jumps out of bed without waiting for staff, reminded to call for assistance. Incisions with steri strips, CDI. JOAQUÍN to bulb suction, stripped, with orange translucent output 90cc this shift. JOAQUÍN dressing changed. Tylenol x1 for pain.

## 2019-05-13 NOTE — PLAN OF CARE
Discharge Planner PT   Patient plan for discharge: Not stated  Current status: Patient supine in bed upon arrival of therapist, agreeable to working with PT. Supine<>sit independently, sit<>stand from EOB with no AD and SBA. Patient ambulated 250 feet with use of IV pole for support and CGA, at times able to ambulate with SBA for very short distance but then needing CGA 2/2 to unsteadiness and narrow base of support. Patient supine in bed at end of session with all needs in reach and bed alarm on. Educated patient on continued walking program with nursing staff with goal of 4 walks/day to upkeep tolerance to activity and strength.   Barriers to return to prior living situation: Current level of assist, stairs  Recommendations for discharge: TCU  Rationale for recommendations: Patient previously independent at baseline and now requiring increased level of A for transfers and ambulation. Per note, patient does not have A available at group home and therefore would need to be independent with all mobility to discharge home. Patient would benefit from continued skilled therapy at TCU to further improve strength, balance, and independence with mobility and ambulation to address functional limitations and decrease falls risk.         Entered by: Karon Martines 05/13/2019 2:08 PM

## 2019-05-13 NOTE — PROGRESS NOTES
General Surgery Progress Note    Admission Date: 5/9/2019  Today's Date: 5/13/2019         Assessment:      Octavio Loya is a 48 year old male with a history of schizophrenia admitted 5/9 with confusion found to have cholecystitis. Underwent lap blaine 5/10 for gangrenous and purulent cholecystitis         Plan:   - Continue Zosyn, cultures with e coli and klebsiella. Complete 7-10 days of abx  - Will collect UA due to urinary complaints  - Medical management per hospitalist  - Bowel regimen, PO pain meds prn  - Work on ambulation  - Lovenox for dvt ppx, recheck platelets tomorrow. PCDs. Continue PO omeprazole  - Appreciate PT/OT  - Continue JOAQUÍN for now, plan to remove prior to discharge. Non-bilious    Dispo: improving daily, expect discharge in 2 days        Interval History:   Tmax 99.9, vitals stable with only mild tachycardia at times. Pain controlled. No nausea, tolerating regular diet.          Physical Exam:   /64 (BP Location: Left arm)   Pulse 91   Temp 98.7  F (37.1  C) (Oral)   Resp 16   Wt 70.5 kg (155 lb 6.8 oz)   SpO2 97%   BMI 22.95 kg/m    I/O last 3 completed shifts:  In: -   Out: 1465 [Urine:1050; Drains:415]  General: NAD, pleasant, alert and awake  Respiratory: non-labored breathing   Abdomen: slightly distended but soft. Tenderness improved. Incisions CDI. JOAQUÍN with serous fluid in bulb    ----------------------------------    Catie Tucker PA-C  [facility]  [phone]

## 2019-05-13 NOTE — PROGRESS NOTES
SPIRITUAL HEALTH SERVICES Progress Note  [facility]    Visited per request.     Pt was resting and reported he is doing okay. SH oriented Pt with  services and knows how to contact  if needed. Pt cited no further needs.     SH has no plans to follow, but available if needs arise.      Rafael Reyna  Chaplain Resident

## 2019-05-13 NOTE — PROGRESS NOTES
SW:    D: following for discharge. Pt potential discharge today to home vs. TCU. OT recommends TCU but could go home to group home if 24 hour supervision is available & receive home OT. SW left message for 24/7 group home line @ [phone] to inquire on services/ supports & 24 hour supervision. Assigned  number is [phone].  P: await call back    GEOVANY Ruano

## 2019-05-13 NOTE — PROGRESS NOTES
[facility]    Medicine Progress Note - Hospitalist Service       Date of Admission:  5/9/2019  Assessment & Plan   Octavio Loya is a 48 year old male admitted on 5/9/2019. He presented with confusion    1: Sepsis due to acute gangrenous cholecystitis with E coli bacteremia, s/p Lap blaine and doing well;  Blood cultures one out of 2 and bile culture grew E.coli  Bile culture also has proteus pansensitive   On IV zosyn plan ceftin on discharge for total of 2weeks of antibiotics with positive culture     2: Acute blood loss anemia, due to surgery and stable.   hgb at 9.5 today     3: Acute metabolic encephalopathy, likely resolved. He has a positive review of systems which is inconsistent and likely has something to do with his Schizophrenia and Anxiety (SchizoAffective disorder???).   4: History of using Levetiracetam for seizure prophylaxis given high doses of Clozapine for his Schizophrenia   5: Mild acute kidney injury and hyponatremia, both resolved  6: History of hypokalemia, resolved  7: Mild hyperglycemia, due to stress. This AM glucose was 131, will monitor  8: Thrombocytopenia, could be stress related but is it related to the Clozapine. Will recheck tomorrow.   Diet: Regular Diet Adult  Room Service    DVT Prophylaxis: Enoxaparin (Lovenox) SQ  Schulz Catheter: not present  Code Status: Full Code      Disposition Plan   Expected discharge: tomorrow , recommended to TCu versus group home with 24hour supervision.social work following   Entered: Rosangela Salas MD 05/13/2019, 2:16 PM       The patient's care was discussed with the Patient.    Rosangela Salas MD  Hospitalist Service  [facility]    ______________________________________________________________________    Interval History      Mr. Loya denies pain. No SOB. Impulsive at times per nursing staff     Data reviewed today: I reviewed all medications, new labs and imaging results over the last 24 hours. I personally  reviewed no images or EKG's today.    Physical Exam   Vital Signs: Temp: 97.7  F (36.5  C) Temp src: Oral BP: 124/75 Pulse: 91 Heart Rate: 77 Resp: 16 SpO2: 100 % O2 Device: None (Room air)    Weight: 155 lbs 6.79 oz  General Appearance: Pleasant, slow to answer, not agitated  Respiratory: clear to A&P  Cardiovascular: Regular rhythm, normal S1 and S2, no murmurs or S3, no edema, calves not tender. Telemetry strips are NSR  GI: incisions clean, decreased bowel sounds, diffusely tender  Skin: ok, no rashes  Other: Affect a bit slow     Data   Recent Labs   Lab 05/13/19  0803 05/12/19  0830 05/11/19  0509  05/10/19  1634  05/09/19  0815   WBC 5.6 6.2 6.3  --  7.7   < > 20.3*   HGB 9.5* 9.3* 9.1*   < > 9.1*   < > 12.5*   MCV 88 87 87  --  86   < > 86   * 115* 84*  --  77*   < > 114*   INR  --   --   --   --  1.36*  --   --     142 143   < > 141   < > 132*   POTASSIUM 3.5 3.6 3.7   < > 3.8   < > 3.3*   CHLORIDE 111* 110* 112*  --  112*   < > 100   CO2 28 29 25  --  23   < > 20   BUN 2* 4* 8  --  10   < > 18   CR 0.76 0.88 0.76  --  0.87   < > 1.37*   ANIONGAP 4 3 6  --  6  --  12   VIJAY 8.1* 8.1* 7.9*  --  7.8*   < > 9.1   * 131* 84  --  99   < > 246*   ALBUMIN  --   --  2.0*  --   --   --  3.2*   PROTTOTAL  --   --  5.0*  --   --   --  7.7   BILITOTAL  --   --  0.5  --   --   --  1.1   ALKPHOS  --   --  105  --   --   --  140   ALT  --   --  24  --   --   --  18   AST  --   --  22  --   --   --  12    < > = values in this interval not displayed.     No results found for this or any previous visit (from the past 24 hour(s)).

## 2019-05-14 ENCOUNTER — APPOINTMENT (OUTPATIENT)
Dept: OCCUPATIONAL THERAPY | Facility: CLINIC | Age: 49
End: 2019-05-14
Payer: COMMERCIAL

## 2019-05-14 ENCOUNTER — APPOINTMENT (OUTPATIENT)
Dept: PHYSICAL THERAPY | Facility: CLINIC | Age: 49
End: 2019-05-14
Payer: COMMERCIAL

## 2019-05-14 LAB
ANION GAP SERPL CALCULATED.3IONS-SCNC: 6 MMOL/L (ref 3–14)
BACTERIA SPEC CULT: NO GROWTH
BLD PROD TYP BPU: NORMAL
BLD PROD TYP BPU: NORMAL
BLD UNIT ID BPU: 0
BLD UNIT ID BPU: 0
BLOOD PRODUCT CODE: NORMAL
BLOOD PRODUCT CODE: NORMAL
BPU ID: NORMAL
BPU ID: NORMAL
BUN SERPL-MCNC: 5 MG/DL (ref 7–30)
CALCIUM SERPL-MCNC: 8.4 MG/DL (ref 8.5–10.1)
CHLORIDE SERPL-SCNC: 110 MMOL/L (ref 94–109)
CO2 SERPL-SCNC: 28 MMOL/L (ref 20–32)
COPATH REPORT: NORMAL
CREAT SERPL-MCNC: 0.79 MG/DL (ref 0.66–1.25)
ERYTHROCYTE [DISTWIDTH] IN BLOOD BY AUTOMATED COUNT: 13.8 % (ref 10–15)
GFR SERPL CREATININE-BSD FRML MDRD: >90 ML/MIN/{1.73_M2}
GLUCOSE BLDC GLUCOMTR-MCNC: 129 MG/DL (ref 70–99)
GLUCOSE SERPL-MCNC: 110 MG/DL (ref 70–99)
HCT VFR BLD AUTO: 28.6 % (ref 40–53)
HGB BLD-MCNC: 9.5 G/DL (ref 13.3–17.7)
INTERPRETATION ECG - MUSE: NORMAL
Lab: NORMAL
MCH RBC QN AUTO: 29 PG (ref 26.5–33)
MCHC RBC AUTO-ENTMCNC: 33.2 G/DL (ref 31.5–36.5)
MCV RBC AUTO: 87 FL (ref 78–100)
PLATELET # BLD AUTO: 171 10E9/L (ref 150–450)
POTASSIUM SERPL-SCNC: 3.7 MMOL/L (ref 3.4–5.3)
RBC # BLD AUTO: 3.28 10E12/L (ref 4.4–5.9)
SODIUM SERPL-SCNC: 144 MMOL/L (ref 133–144)
SPECIMEN SOURCE: NORMAL
TRANSFUSION STATUS PATIENT QL: NORMAL
WBC # BLD AUTO: 6.8 10E9/L (ref 4–11)

## 2019-05-14 PROCEDURE — 12000000 ZZH R&B MED SURG/OB

## 2019-05-14 PROCEDURE — 80048 BASIC METABOLIC PNL TOTAL CA: CPT | Performed by: INTERNAL MEDICINE

## 2019-05-14 PROCEDURE — 25000128 H RX IP 250 OP 636: Performed by: SURGERY

## 2019-05-14 PROCEDURE — 25000128 H RX IP 250 OP 636: Performed by: INTERNAL MEDICINE

## 2019-05-14 PROCEDURE — 25000132 ZZH RX MED GY IP 250 OP 250 PS 637: Performed by: INTERNAL MEDICINE

## 2019-05-14 PROCEDURE — 97535 SELF CARE MNGMENT TRAINING: CPT | Mod: GO | Performed by: OCCUPATIONAL THERAPIST

## 2019-05-14 PROCEDURE — 97530 THERAPEUTIC ACTIVITIES: CPT | Mod: GO | Performed by: OCCUPATIONAL THERAPIST

## 2019-05-14 PROCEDURE — 00000146 ZZHCL STATISTIC GLUCOSE BY METER IP

## 2019-05-14 PROCEDURE — 85027 COMPLETE CBC AUTOMATED: CPT | Performed by: INTERNAL MEDICINE

## 2019-05-14 PROCEDURE — 99232 SBSQ HOSP IP/OBS MODERATE 35: CPT | Performed by: INTERNAL MEDICINE

## 2019-05-14 PROCEDURE — 97116 GAIT TRAINING THERAPY: CPT | Mod: GP

## 2019-05-14 PROCEDURE — 36415 COLL VENOUS BLD VENIPUNCTURE: CPT | Performed by: INTERNAL MEDICINE

## 2019-05-14 RX ORDER — ACETAMINOPHEN 325 MG/1
650 TABLET ORAL EVERY 4 HOURS PRN
Qty: 60 TABLET | Refills: 1 | Status: SHIPPED | OUTPATIENT
Start: 2019-05-14 | End: 2019-05-14

## 2019-05-14 RX ORDER — CEFUROXIME AXETIL 500 MG/1
500 TABLET ORAL 2 TIMES DAILY
Qty: 20 TABLET | Refills: 0 | DISCHARGE
Start: 2019-05-14 | End: 2019-06-12

## 2019-05-14 RX ORDER — CLONAZEPAM 1 MG/1
0.5 TABLET ORAL AT BEDTIME
Qty: 30 TABLET | Refills: 0 | Status: SHIPPED | DISCHARGE
Start: 2019-05-14

## 2019-05-14 RX ORDER — BUSPIRONE HYDROCHLORIDE 15 MG/1
30 TABLET ORAL 2 TIMES DAILY
Status: DISCONTINUED | OUTPATIENT
Start: 2019-05-14 | End: 2019-05-15 | Stop reason: HOSPADM

## 2019-05-14 RX ORDER — LEVETIRACETAM 500 MG/1
500 TABLET ORAL 2 TIMES DAILY
Status: DISCONTINUED | OUTPATIENT
Start: 2019-05-14 | End: 2019-05-15 | Stop reason: HOSPADM

## 2019-05-14 RX ORDER — ACETAMINOPHEN 325 MG/1
650 TABLET ORAL EVERY 4 HOURS PRN
Qty: 60 TABLET | Refills: 1 | DISCHARGE
Start: 2019-05-14 | End: 2019-06-12

## 2019-05-14 RX ADMIN — LEVETIRACETAM 500 MG: 500 TABLET, FILM COATED ORAL at 21:56

## 2019-05-14 RX ADMIN — PROPRANOLOL HYDROCHLORIDE 10 MG: 10 TABLET ORAL at 08:15

## 2019-05-14 RX ADMIN — ENOXAPARIN SODIUM 40 MG: 40 INJECTION SUBCUTANEOUS at 08:15

## 2019-05-14 RX ADMIN — SERTRALINE HYDROCHLORIDE 50 MG: 50 TABLET ORAL at 13:11

## 2019-05-14 RX ADMIN — PIPERACILLIN SODIUM,TAZOBACTAM SODIUM 3.38 G: 3; .375 INJECTION, POWDER, FOR SOLUTION INTRAVENOUS at 21:56

## 2019-05-14 RX ADMIN — BUSPIRONE HYDROCHLORIDE 30 MG: 15 TABLET ORAL at 21:56

## 2019-05-14 RX ADMIN — PROPRANOLOL HYDROCHLORIDE 10 MG: 10 TABLET ORAL at 17:00

## 2019-05-14 RX ADMIN — LEVETIRACETAM 500 MG: 500 TABLET, FILM COATED ORAL at 13:10

## 2019-05-14 RX ADMIN — BUSPIRONE HYDROCHLORIDE 30 MG: 15 TABLET ORAL at 13:11

## 2019-05-14 RX ADMIN — PIPERACILLIN SODIUM,TAZOBACTAM SODIUM 3.38 G: 3; .375 INJECTION, POWDER, FOR SOLUTION INTRAVENOUS at 17:02

## 2019-05-14 RX ADMIN — OMEPRAZOLE 40 MG: 20 CAPSULE, DELAYED RELEASE ORAL at 08:15

## 2019-05-14 RX ADMIN — CLOZAPINE 500 MG: 100 TABLET ORAL at 21:56

## 2019-05-14 RX ADMIN — PROPRANOLOL HYDROCHLORIDE 10 MG: 10 TABLET ORAL at 21:58

## 2019-05-14 RX ADMIN — CLOZAPINE 300 MG: 100 TABLET ORAL at 17:00

## 2019-05-14 RX ADMIN — PIPERACILLIN SODIUM,TAZOBACTAM SODIUM 3.38 G: 3; .375 INJECTION, POWDER, FOR SOLUTION INTRAVENOUS at 04:09

## 2019-05-14 RX ADMIN — PIPERACILLIN SODIUM,TAZOBACTAM SODIUM 3.38 G: 3; .375 INJECTION, POWDER, FOR SOLUTION INTRAVENOUS at 13:11

## 2019-05-14 ASSESSMENT — ACTIVITIES OF DAILY LIVING (ADL)
ADLS_ACUITY_SCORE: 14

## 2019-05-14 NOTE — PLAN OF CARE
Discharge Planner OT   Patient plan for discharge: open to TCU  Current status: pt educated in log roll tech for supine <> sit and able to complete with SBA and step by step cues. Pt able to stand at sink with SBA for oral facial hygiene tasks for cues to initiate tasks and completed toilet transfer with SBA standard height toilet with cues for grab bar use.   Barriers to return to prior living situation: headache and ab pain, decreased safety, activity tolerance. Baseline cognitive deficits.   Recommendations for discharge: TCU  Rationale for recommendations: Pt will requires daily therapy to increase ADL and functional mobility independence and safety to PLOF. Pending progress pt may be able to return home with staff A with IADL's ie med mgmt, cooking, etc.          Entered by: Marce Bacon 05/14/2019 9:20 AM

## 2019-05-14 NOTE — PLAN OF CARE
Disoriented to situation, pleasant. Calm and cooperative throughout shift. PIV SL'd, intermittent abx. JOAQUÍN drained removed today, surgery signed off. Tolerating regular diet, some diarrhea throughout the day. VSS ex daim at times. Denies pain. Spoke with [facility] who said they are reviewing his file and will know more about placement tomorrow. Plan for discharge pending TCU placement, pt would like a group home setting.

## 2019-05-14 NOTE — PLAN OF CARE
Discharge Planner PT   Patient plan for discharge: Not stated  Current status: Patient supine in bed upon arrival of therapist, agreeable to working with PT. Supine<>sit independently, sit<>stand from EOB with no AD and independently. Patient ambulated 300 feet with no AD and CGA, intermittently needing Min A 2/2 to LOB with ambulation due to narrow base of support and scissoring gait. Patient navigated 3 steps with single hand rail, step to step progression and CGA for balance support. Patient utilized restroom with supervision then returned to supine at end of session with all needs in reach and bed alarm on zone 2.   Barriers to return to prior living situation: Current level of A, decreased balance, stairs  Recommendations for discharge: TCU  Rationale for recommendations: Patient previously resided in group home independently, no A available at time of discharge. Patient currently requiring CGA to Min A for ambulation with no AD 2/2 to balance deficits. Patient would benefit from continued skilled therapy at TCU to further improve strength, balance, and independence with mobility and ambulation to address functional limitations and decrease falls risk.         Entered by: Karon Martines 05/14/2019 11:51 AM

## 2019-05-14 NOTE — DISCHARGE INSTRUCTIONS
[facility]  Discharge Instructions: Post-Operative Laparoscopic Cholecystectomy    ACTIVITY    Expect to feel tired after your surgery.  This will gradually resolve.      Take frequent, short walks and increase your activity gradually.      Avoid strenuous physical activity or heavy lifting greater than 15-20 lbs. for 2-3 weeks.  You may climb stairs.    You may drive without restrictions when you are not using any prescription pain medication and feel comfortable in a car.    You may return to work/school when you are comfortable without any prescription pain medication.    WOUND CARE    You may remove your outer dressing or Band-Aids and shower 48 hours after the surgery.  Pat your incisions dry and leave them open to air.  Re-apply dressing (Band-Aids or gauze/tape) as needed for comfort or drainage.    You have steri-strips (looks like white tape) on your incision.  You may peel off the steri-strips 2 weeks after your surgery if they have not peeled off on their own.     Do not soak your incisions in a tub or pool for 2 weeks.     Do not apply any lotions, creams, or ointments to your incisions.    A ridge under your incisions is normal and will gradually resolve.    DIET    Start with liquids, then gradually resume your regular diet as tolerated.  Avoid heavy, spicy, and greasy meals for 2-3 days.    Drink plenty of fluids to stay hydrated.    It is not uncommon to experience some loose stools or diarrhea after surgery.  This is your body s way of adapting to the bile which will slowly drain into your intestine.  A low fat diet may help with this.  This should improve over 1-2 months.    PAIN    Expect some tenderness and discomfort at the incision sites.  Use the prescribed pain medication at your discretion.  Expect gradual resolution of your pain over several days.    You may take ibuprofen with food (unless you have been told not to) instead of or in addition to your  prescribed pain medication.  If you are taking Norco or Percocet, do not take any additional acetaminophen/APAP/Tylenol.    Do not drink alcohol or drive while you are taking pain medications.    You may apply ice to your incisions in 20 minute intervals as needed for the next 48 hours.  After that time, consider switching to heat if you prefer.    EXPECTATIONS    Pain medications can cause constipation.  Limit use when possible.  Take over the counter stool softener/stimulant, such as Colace or Senna, 1-2 times a day with plenty of water.  You may take a mild over the counter laxative, such as Miralax or a suppository, as needed.  You may discontinue these medications once you are having regular bowel movements and/or are no longer taking your narcotic pain medication.       You may have shoulder or upper back discomfort due to the gas used in surgery.  This is temporary and should resolve in 48-72 hours.  Short, frequent walks may help with this.    FOLLOW UP  Please follow-up in 2 weeks in Dr. Mejia's office for your first postoperative appointment. Call [phone] to schedule.     Our clinic's name is [facility]. The address is [address] [place] [address] [address], [facility] [address], [place], [place], [address]     CALL OUR OFFICE -[phone] IF YOU HAVE:     Chills or fever above 101 F.    Increased redness, warmth, or drainage at your incisions.    Significant bleeding.    Pain not relieved by your pain medication or rest.    Increasing pain after the first 48 hours.    Any other concerns or questions.    Revised July 2018

## 2019-05-14 NOTE — PROGRESS NOTES
General Surgery Progress Note    Subjective: Pt doing well. Minimal abdominal pain. JOAQUÍN with serous output.     Objective: /67 (BP Location: Right arm)   Pulse 58   Temp 98.3  F (36.8  C) (Oral)   Resp 16   Wt 69.2 kg (152 lb 8.9 oz)   SpO2 97%   BMI 22.53 kg/m    Gen: alert, no distress  CV: RRR  Pulm: nonlabored breathing  Abd: soft, nd, nt, JOAQUÍN w serous output  Ext: WWP    Assessment/Plan:   Octavio Loya  is a 48 year old male s/p laparoscopic cholecystectomy for gangrenous cholecystitis. Doing well. LFTs normal post operatively.   - remove drain today  - ok to discharge later today from surgical perspective    Vannessa Mejia MD  [facility]  [phone]

## 2019-05-14 NOTE — PROGRESS NOTES
SW:    I: SW following for discharge planning. Pt recommended to discharge to TCU. SW has left messages for pt brothers Jared and Austin to discuss preferences with no response. As pt nearing discharge, SW began referral process for placement, initiating referrals to facilities near pt GH. CHARISSA faxed referrals via [facility] to: [facility], [facility], [facility], and [facility]. SW will continue to attempt communication with pt family.    P: SW to follow.    ADDENDUM: CHARISSA spoke with Ora Moran CM at [facility] [phone]. Per Luisa, pt DOES NOT have a legal guardian and is his own decision maker at baseline. Luisa clarified that pt home is not categorized as a group home, it is a lodge. Pt resides in the residence independently with staff checking in 1-2/week. Luisa meets with pt 1x/month and pt [facility] worker meets with him 1/week. [facility], he resides at [id number] ([place] [address]. Pt moved to this residence in June 2018.     CHARISSA will send TCU referral to [facility] on [facility] and [facility], as these locations near pt home. SW to update Luisa once TCU location found. SW will also connect with pt once TCU location confirmed, aware that this is recommended and receptive.    PAS-RR    D: Per DHS regulation, CHARISSA completed and submitted PAS-RR to [place] [employer]ging Direct Connect via the Senior LinkAge Line.  PAS-RR confirmation # is : [id number]  I: CHARISSA spoke with pt and they are aware a PAS-RR has been submitted.  SW reviewed with pt that they may be contacted for a follow up appointment within 10 days of hospital discharge if their SNF stay is < 30 days.  Contact information for Senior LinkAge Line was also provided.    A: Pt verbalized understanding.    P: Further questions may be directed to Senior [employer]Age Line at #[phone], option #4 for PAS-RR staff.    1545: Pt declined at [facility] due to mental health dx, [facility] currently  reviewing. CHARISSA sent additional referrals to: [facility], [facility], [place], and [facility]. Pt updated.    1630: [facility] [place] [facility] tomorrow 5/15/19. CHARISSA has arranged HE wc transport for 1200.        DULCE MARIA Sierra, [facility] CenterSW  Daytime (8:00am-4:30pm): [phone]  After-Hours CHARISSA Pager (4:30pm-11:30pm): [phone]

## 2019-05-14 NOTE — PLAN OF CARE
VSS on RA. A/Ox3, slightly disoriented to situation. Abdominal incision with 3 lap sites CDI w/steri strips BRIDGETTE. Denied pain. Up with SBA. Regular diet. BS +, Flatus +. Voiding +. LS clear. JOAQUÍN striped and drained, 80cc output. One episode of loose stool. Continue to monitor.

## 2019-05-14 NOTE — PROGRESS NOTES
[facility]    Medicine Progress Note - Hospitalist Service       Date of Admission:  5/9/2019  Assessment & Plan   Octavio Loya is a 48 year old male admitted on 5/9/2019. He presented with confusion    1: Sepsis due to acute gangrenous cholecystitis with E coli bacteremia, s/p Lap blaine and doing well;  Blood cultures one out of 2 and bile culture grew E.coli  Bile culture also has proteus pansensitive   On IV zosyn plan ceftin on discharge for total of 2weeks of antibiotics with positive culture     2: Acute blood loss anemia, due to surgery and stable.   hgb at 9.5 today     3: Acute metabolic encephalopathy, likely resolved. He has a positive review of systems which is inconsistent and likely has something to do with his Schizophrenia and Anxiety (SchizoAffective disorder???).   4: History of using Levetiracetam for seizure prophylaxis given high doses of Clozapine for his Schizophrenia   5: Mild acute kidney injury and hyponatremia, both resolved  6: History of hypokalemia, resolved  7: Mild hyperglycemia, due to stress. This AM glucose was 131, will monitor  8: Thrombocytopenia, could be stress related but is it related to the Clozapine. Will recheck tomorrow.   Diet: Regular Diet Adult  Room Service  Advance Diet as Tolerated    DVT Prophylaxis: Enoxaparin (Lovenox) SQ  Schulz Catheter: not present  Code Status: Full Code      Disposition Plan   Expected discharge: to TCU when bed is available   Entered: Rosangela Salas MD 05/14/2019, 1:04 PM       The patient's care was discussed with the Patient.    Rosangela Salas MD  Hospitalist Service  [facility]    ______________________________________________________________________    Interval History      Mr. Loya denies pain. No SOB. tolerating diet     Data reviewed today: I reviewed all medications, new labs and imaging results over the last 24 hours. I personally reviewed no images or EKG's today.    Physical Exam   Vital Signs:  Temp: 98.6  F (37  C) Temp src: Oral BP: 111/62 Pulse: 64 Heart Rate: 70 Resp: 16 SpO2: 97 % O2 Device: None (Room air)    Weight: 152 lbs 8.93 oz  General Appearance: Pleasant, slow to answer  Respiratory: clear to A&P  Cardiovascular: Regular rhythm, normal S1 and S2, no murmurs or S3, no edema, calves not tender. Telemetry strips are NSR  GI: incisions clean, decreased bowel sounds, diffusely tender  Skin: ok, no rashes  Other: Affect a bit slow     Data   Recent Labs   Lab 05/14/19  0709 05/13/19  0803 05/12/19  0830 05/11/19  0509  05/10/19  1634  05/09/19  0815   WBC 6.8 5.6 6.2 6.3  --  7.7   < > 20.3*   HGB 9.5* 9.5* 9.3* 9.1*   < > 9.1*   < > 12.5*   MCV 87 88 87 87  --  86   < > 86    141* 115* 84*  --  77*   < > 114*   INR  --   --   --   --   --  1.36*  --   --     143 142 143   < > 141   < > 132*   POTASSIUM 3.7 3.5 3.6 3.7   < > 3.8   < > 3.3*   CHLORIDE 110* 111* 110* 112*  --  112*   < > 100   CO2 28 28 29 25  --  23   < > 20   BUN 5* 2* 4* 8  --  10   < > 18   CR 0.79 0.76 0.88 0.76  --  0.87   < > 1.37*   ANIONGAP 6 4 3 6  --  6  --  12   VIJAY 8.4* 8.1* 8.1* 7.9*  --  7.8*   < > 9.1   * 128* 131* 84  --  99   < > 246*   ALBUMIN  --   --   --  2.0*  --   --   --  3.2*   PROTTOTAL  --   --   --  5.0*  --   --   --  7.7   BILITOTAL  --   --   --  0.5  --   --   --  1.1   ALKPHOS  --   --   --  105  --   --   --  140   ALT  --   --   --  24  --   --   --  18   AST  --   --   --  22  --   --   --  12    < > = values in this interval not displayed.     No results found for this or any previous visit (from the past 24 hour(s)).

## 2019-05-14 NOTE — PROGRESS NOTES
General Surgery Progress Note    Admission Date: 5/9/2019  Today's Date: 5/14/2019         Assessment:      Octavio Loya is a 48 year old male with a history of schizophrenia admitted 5/9 with confusion found to have cholecystitis. Underwent lap blaine 5/10 for gangrenous and purulent cholecystitis         Plan:   - OK for discharge to TCU/group home today when cleared by medicine  - Zosyn while inpatient, plan for 2 total weeks of antibiotics, Ceftin per hospitalist note  - Blood in UA, negative for infection. Continue to monitor at discharge for resolution of hematuria, follow-up outpatient  - Continue bowel regimen prn, currently having good bowel function  - Using tylenol for pain, no narcotics needed  - Lovenox and PCDs while inpatient, platelets much improved  - JOAQUÍN drain non-bilious. Removed at bedside          Interval History:   Afebrile overnight, vitals stable on room air. JOAQUÍN with 240cc out. Pt endorses some mild abdominal pain, controlled with Tylenol as needed. Having bowel movements. Voiding adequately, hematuria improved. Tolerating regular diet, no nausea.           Physical Exam:   /67 (BP Location: Right arm)   Pulse 58   Temp 98.3  F (36.8  C) (Oral)   Resp 16   Wt 69.2 kg (152 lb 8.9 oz)   SpO2 97%   BMI 22.53 kg/m    I/O last 3 completed shifts:  In: 780 [P.O.:780]  Out: 415 [Urine:175; Drains:240]  General: NAD, pleasant, alert and awake  Respiratory: non-labored breathing   Abdomen: soft, no distention. Minimal appropriate tenderness to palpation around incisions. JOAQUÍN with serous fluid in bulb. Removed without difficulty  Incision: clean, dry, and intact      LABS:  Recent Labs   Lab Test 05/14/19  0709 05/13/19  0803 05/12/19  0830   WBC 6.8 5.6 6.2   HGB 9.5* 9.5* 9.3*   MCV 87 88 87    141* 115*      Recent Labs   Lab Test 05/14/19  0709 05/13/19  0803 05/12/19  0830   POTASSIUM 3.7 3.5 3.6   CHLORIDE 110* 111* 110*   CO2 28 28 29   BUN 5* 2* 4*   CR 0.79 0.76 0.88    ANIONGAP 6 4 3      ------------------------------      Catie Tucker PA-C  [facility]: [phone]  Pager: [phone]@"[employer] (7am-4pm)

## 2019-05-15 VITALS
HEART RATE: 85 BPM | DIASTOLIC BLOOD PRESSURE: 78 MMHG | OXYGEN SATURATION: 99 % | RESPIRATION RATE: 16 BRPM | WEIGHT: 152.56 LBS | BODY MASS INDEX: 22.53 KG/M2 | TEMPERATURE: 98.1 F | SYSTOLIC BLOOD PRESSURE: 121 MMHG

## 2019-05-15 LAB
BACTERIA SPEC CULT: NO GROWTH
Lab: NORMAL
SPECIMEN SOURCE: NORMAL

## 2019-05-15 PROCEDURE — 25000132 ZZH RX MED GY IP 250 OP 250 PS 637: Performed by: INTERNAL MEDICINE

## 2019-05-15 PROCEDURE — 25000128 H RX IP 250 OP 636: Performed by: SURGERY

## 2019-05-15 PROCEDURE — 99239 HOSP IP/OBS DSCHRG MGMT >30: CPT | Performed by: INTERNAL MEDICINE

## 2019-05-15 PROCEDURE — 25000128 H RX IP 250 OP 636: Performed by: INTERNAL MEDICINE

## 2019-05-15 RX ADMIN — PROPRANOLOL HYDROCHLORIDE 10 MG: 10 TABLET ORAL at 08:59

## 2019-05-15 RX ADMIN — ACETAMINOPHEN 650 MG: 325 TABLET, FILM COATED ORAL at 08:54

## 2019-05-15 RX ADMIN — SERTRALINE HYDROCHLORIDE 50 MG: 50 TABLET ORAL at 08:59

## 2019-05-15 RX ADMIN — CLOZAPINE 300 MG: 100 TABLET ORAL at 16:04

## 2019-05-15 RX ADMIN — PIPERACILLIN SODIUM,TAZOBACTAM SODIUM 3.38 G: 3; .375 INJECTION, POWDER, FOR SOLUTION INTRAVENOUS at 16:05

## 2019-05-15 RX ADMIN — BUSPIRONE HYDROCHLORIDE 30 MG: 15 TABLET ORAL at 08:59

## 2019-05-15 RX ADMIN — LEVETIRACETAM 500 MG: 500 TABLET, FILM COATED ORAL at 08:59

## 2019-05-15 RX ADMIN — PIPERACILLIN SODIUM,TAZOBACTAM SODIUM 3.38 G: 3; .375 INJECTION, POWDER, FOR SOLUTION INTRAVENOUS at 04:26

## 2019-05-15 RX ADMIN — OMEPRAZOLE 40 MG: 20 CAPSULE, DELAYED RELEASE ORAL at 06:15

## 2019-05-15 RX ADMIN — PIPERACILLIN SODIUM,TAZOBACTAM SODIUM 3.38 G: 3; .375 INJECTION, POWDER, FOR SOLUTION INTRAVENOUS at 11:52

## 2019-05-15 RX ADMIN — ENOXAPARIN SODIUM 40 MG: 40 INJECTION SUBCUTANEOUS at 08:59

## 2019-05-15 ASSESSMENT — ACTIVITIES OF DAILY LIVING (ADL)
ADLS_ACUITY_SCORE: 14
ADLS_ACUITY_SCORE: 20
ADLS_ACUITY_SCORE: 14
ADLS_ACUITY_SCORE: 20
ADLS_ACUITY_SCORE: 14

## 2019-05-15 NOTE — DISCHARGE SUMMARY
[facility]  Discharge Summary        Octavio Loya MRN# [id number]   YOB: 1970 Age: 48 year old     Date of Admission:  5/9/2019  Date of Discharge:  5/15/2019  Admitting Physician:  Ravi Hammonds MD  Discharge Physician: Rosangela Salas MD  Discharging Service: Hospitalist     Primary Provider: Olga Silva  Primary Care Physician Phone Number: [phone]         Discharge Diagnoses/Problem Oriented Hospital Course (Providers):    Octavio Loya was admitted on 5/9/2019 by Ravi Hammonds MD and I would refer you to their history and physical.  The following problems were addressed during his hospitalization:  Assessment & Plan     Octavio Loya is a 48 year old male admitted on 5/9/2019. He presented with confusion     1: Sepsis due to acute gangrenous cholecystitis with E coli bacteremia, s/p Lap blaine and doing well;  Blood cultures one out of 2 and bile culture grew E.coli  Bile culture also has proteus pansensitive   On IV zosyn plan ceftin 500 mg p.o. twice daily on discharge for total of 2weeks of antibiotics with positive culture      2: Acute blood loss anemia, due to surgery and stable.   hgb at 9.5 today      3: Acute metabolic encephalopathy most likely due to cholecystitis with sepsis   Mental status much improved currently  4: History of schizophrenia:  Restarted his multiple prior to admission psychiatric medications  On Keppra to prevent seizures on high-dose clozapine  Restarted all his PSychiatric medications and he is tolerating them well and no need to titrate these medications at this point of time      5: Mild acute kidney injury and hyponatremia, both resolved  6: History of hypokalemia, resolved    8: Thrombocytopenia,   Most likely due to sepsis  Improved to 171 today     Diet: Regular Diet Adult  Room Service  Advance Diet as Tolerated    DVT Prophylaxis: Enoxaparin (Lovenox) SQ  Schulz Catheter: not present  Code Status: Full Code           Disposition Plan     Expected discharge: to TCU today   Rosangela Salas MD  Pager [phone]               Code Status:      Full Code         Important Results:      See below so         Pending Results:        Unresulted Labs Ordered in the Past 30 Days of this Admission     Date and Time Order Name Status Description    5/10/2019 1649 Anaerobic bacterial culture Preliminary                Discharge Instructions and Follow-Up:      F/u with Dr.Sarah Mejia in 2weeks          Discharge Disposition:      Discharged to short-term care facility         Discharge Medications:        Current Discharge Medication List      START taking these medications    Details   acetaminophen (TYLENOL) 325 MG tablet Take 2 tablets (650 mg) by mouth every 4 hours as needed for pain  Qty: 60 tablet, Refills: 1    Associated Diagnoses: Postoperative pain      cefuroxime (CEFTIN) 500 MG tablet Take 1 tablet (500 mg) by mouth 2 times daily for 10 days  Qty: 20 tablet, Refills: 0    Associated Diagnoses: E coli bacteremia         CONTINUE these medications which have CHANGED    Details   clonazePAM (KLONOPIN) 1 MG tablet Take 0.5 tablets (0.5 mg) by mouth At Bedtime  Qty: 30 tablet, Refills: 0    Associated Diagnoses: Schizoaffective disorder, depressive type (H)         CONTINUE these medications which have NOT CHANGED    Details   busPIRone HCl (BUSPAR) 30 MG tablet Take 30 mg by mouth 2 times daily      !! cloZAPine (CLOZARIL) 100 MG tablet Take 300 mg by mouth every evening At 5 pm      !! cloZAPine (CLOZARIL) 100 MG tablet Take 500 mg by mouth At Bedtime       levETIRAcetam (KEPPRA) 500 MG tablet Take 500 mg by mouth 2 times daily       multivitamin, therapeutic (THERA-VIT) TABS tablet Take 1 tablet by mouth daily      propranolol (INDERAL) 40 MG tablet Take 40 mg by mouth      ranitidine (ZANTAC) 150 MG tablet Take 150 mg by mouth 2 times daily      sennosides (SENOKOT) 8.6 MG tablet Take 1 tablet by mouth 2 times daily  as needed for constipation      sertraline (ZOLOFT) 50 MG tablet Take 50 mg by mouth daily       !! - Potential duplicate medications found. Please discuss with provider.               Allergies:         Allergies   Allergen Reactions     Depakote [Valproic Acid]             Consultations This Hospital Stay:      Consultation during this admission received from surgery         Condition and Physical Exam on Discharge:      Discharge condition: Stable   Discharge vitals: Blood pressure 110/69, pulse 70, temperature 98.7  F (37.1  C), temperature source Oral, resp. rate 18, weight 69.2 kg (152 lb 8.9 oz), SpO2 98 %.     Constitutional:  Alert and awake   Lungs:  Clear to auscultation, no rales rhonchi or wheezing   Cardiovascular:  Normal rate rhythm regular no murmurs   Abdomen:  Soft, nontender nondistended bowel sounds positive   Skin:  Warm and dry   Other:            Discharge Orders for Skilled Facility (from Discharge Orders):        After Care Instructions     Activity - Up ad jolene      Advance Diet as Tolerated      Follow this diet upon discharge: regular         General info for SNF      Length of Stay Estimate: Short Term Care: Estimated # of Days <30  Condition at Discharge: Stable  Level of care:skilled   Rehabilitation Potential: Good  Admission H&P remains valid and up-to-date: Yes  Recent Chemotherapy: N/A  Use Nursing Home Standing Orders: Yes         Mantoux instructions      Give two-step Mantoux (PPD) Per Facility Policy Yes                    Rehab orders for Skilled Facility (from Discharge Orders):      Referrals     Future Labs/Procedures    Occupational Therapy Adult Consult     Comments:    Evaluate and treat as clinically indicated.    Reason:  weakness    Physical Therapy Adult Consult     Comments:    Evaluate and treat as clinically indicated.    Reason:  weakness             Discharge Time:      Greater than 30 minutes.        Image Results From This Hospital Stay (For Non-EPIC  Providers):        Results for orders placed or performed during the hospital encounter of 05/09/19   CT Head w/o Contrast    Narrative    CT SCAN OF THE HEAD WITHOUT CONTRAST   5/9/2019 8:38 AM     HISTORY: Schizophrenia and altered mental status.    TECHNIQUE:  Axial images of the head and coronal reformations without  IV contrast material. Radiation dose for this scan was reduced using  automated exposure control, adjustment of the mA and/or kV according  to patient size, or iterative reconstruction technique.    COMPARISON: None.    FINDINGS: There is no evidence of intracranial hemorrhage, mass, acute  infarct or anomaly. The ventricles are normal in size, shape and  configuration. The brain parenchyma and subarachnoid spaces are  normal.     The visualized portions of the sinuses and mastoids appear normal. The  bony calvarium and bones of the skull base appear intact.       Impression    IMPRESSION:   No evidence of acute intracranial hemorrhage, mass, or  herniation.     CIELO DESOUZA MD   XR Chest 2 Views    Narrative    CHEST TWO VIEWS May 9, 2019 8:46 AM     HISTORY: Altered mental status and elevated white blood cell count.    COMPARISON: None.    FINDINGS: Heart size and pulmonary vascularity are within normal  limits. The lungs are clear. No pneumothorax or pleural effusion.       Impression    IMPRESSION: No radiographic evidence of acute chest abnormality.     CHRIS RUBIN MD   CT Abdomen Pelvis w Contrast    Narrative    CT ABDOMEN AND PELVIS WITH CONTRAST 5/10/2019 11:12 AM     HISTORY: Abdominal pain, diverticulitis suspected. E coli bacteremia,  left lower quadrant tenderness, leukocytosis.    COMPARISON: None.    TECHNIQUE: Volumetric helical acquisition of CT images from the lung  bases through the symphysis pubis after the administration of 73 mL  Isovue-370  intravenous contrast. Radiation dose for this scan was  reduced using automated exposure control, adjustment of the mA and/or  kV  according to patient size, or iterative reconstruction technique.    FINDINGS: Severe inflammatory change and wall thickening of the  gallbladder compatible with severe cholecystitis. Marked splenomegaly  at 18.2 cm. Liver, adrenal glands, and pancreas demonstrate no  worrisome focal lesion. No bowel obstruction. Moderate to large amount  of left-sided stool. Unremarkable appendix. There are no dilated loops  of small intestine or large bowel to suggest ileus or obstruction. No  diverticulitis. Small amount of free fluid. No free intraperitoneal  air. Normal caliber aorta. No hydronephrosis. Survey of the visualized  bony structures demonstrates no destructive bony lesions. The  visualized lung bases are unremarkable.      Impression    IMPRESSION:  1. Marked gallbladder wall thickening and marked surrounding  inflammatory change compatible with acute cholecystitis.  2. Splenomegaly at 18.1 cm.  3. Small amount of pelvic free fluid.  4. Moderate to large amount of left-sided stool.    Results called to Dr. Hammonds on May 10, 2019 at 1:31 PM.    ROB COSBY MD   US Lower Extremity Venous Duplex Bilateral    Narrative    US LOWER EXTREMITY VENOUS DUPLEX BILATERAL5/11/2019 10:29 AM    HISTORY: Rule out deep venous thrombosis. Left-sided chest pain.    TECHNIQUE:  Venous Doppler US.?Color flow and spectral Doppler with  waveform analysis performed.      Impression    IMPRESSION:  No evidence of lower extremity deep venous thrombosis.    STUART GANT MD           Most Recent Lab Results In EPIC (For Non-EPIC Providers):    Most Recent 3 CBC's:  Recent Labs   Lab Test 05/14/19  0709 05/13/19  0803 05/12/19  0830   WBC 6.8 5.6 6.2   HGB 9.5* 9.5* 9.3*   MCV 87 88 87    141* 115*      Most Recent 3 BMP's:  Recent Labs   Lab Test 05/14/19  0709 05/13/19  0803 05/12/19  0830    143 142   POTASSIUM 3.7 3.5 3.6   CHLORIDE 110* 111* 110*   CO2 28 28 29   BUN 5* 2* 4*   CR 0.79 0.76 0.88   ANIONGAP 6 4 3   VIJAY 8.4*  8.1* 8.1*   * 128* 131*     Most Recent 3 Troponin's:No lab results found.    Invalid input(s): TROP, TROPONINIES  Most Recent 3 INR's:  Recent Labs   Lab Test 05/10/19  1634   INR 1.36*     Most Recent 2 LFT's:  Recent Labs   Lab Test 05/11/19  0509 05/09/19  0815   AST 22 12   ALT 24 18   ALKPHOS 105 140   BILITOTAL 0.5 1.1     Most Recent Cholesterol Panel:No lab results found.  Most Recent 6 Bacteria Isolates From Any Culture (See EPIC Reports for Culture Details):  Recent Labs   Lab Test 05/10/19  1649 05/09/19  1150 05/09/19  1030 05/09/19  0920   CULT Heavy growth  Escherichia coli  *  Moderate growth  Klebsiella pneumoniae  *  Culture negative monitoring continues No growth Cultured on the 1st day of incubation:  Escherichia coli  *  Critical Value/Significant Value, preliminary result only, called to and read back by  Susy Oliveira RN 0054 05/10/19 AA    (Note)  POSITIVE for E.COLI by Verigene multiplex nucleic acid test. Final  identification and antimicrobial susceptibility testing will be  verified by standard methods. Verigene test will not distinguish  E.coli from Shigella species including S.dysenteriae, S.flexneri,  S.boydii, and S.sonnei. Specimens containing Shigella species or  E.coli will be reported as Positive for E.coli.    Specimen tested with Verigene multiplex, gram-negative blood culture  nucleic acid test for the following targets: Acinetobacter sp.,  Citrobacter sp., Enterobacter sp., Proteus sp., E. coli, K.  pneumoniae/oxytoca, P. aeruginosa, and the following resistance  markers: CTXM, KPC, NDM, VIM, IMP and OXA.    Critical Value/Significant Value called to and read back by SUSY OLIVEIRA RN SHOBS 0303 05.10.19 CF   No growth     Most Recent TSH, T4 and HgbA1c:  Recent Labs   Lab Test 05/10/19  0630   A1C 4.9

## 2019-05-15 NOTE — PROGRESS NOTES
Clarified questions for facility regarding the Clozaril, Updated them with the pharmacy where last filled at [facility] in [place] at phone [phone]. I discussed with Dr. Salas the question about if Clozaril needs further titrating. Per Dr. Salas still ok for discharge, relayed all this to facility and spoke to Stephen at [phone]. Per Stephen they will need the amended Discharge Summary and they are able to fill med as it is not being titrated. Relayed to Stephen the ride time of 5 pm to their facility. Amended summary faxed to their admissions at [phone].

## 2019-05-15 NOTE — PROGRESS NOTES
Patient updated with time and location of discharge. Follow up appointment with Dr. Mejia arranged.

## 2019-05-15 NOTE — PLAN OF CARE
Disoriented to situation. VSS on RA. Lap blaine sites WNL, slight bruising. JOAQUÍN was pulled today, dressing CDI. Denies pain. IV antibiotics. Calm and cooperative, at times with urinary urgency, incontinence. Slight pink spots noted on pullups, pt continues to report some discomfort with urination. Up with SBA. Regular diet. Plan for discharge today.

## 2019-05-15 NOTE — PLAN OF CARE
Occupational Therapy Discharge Summary    Reason for therapy discharge:    Discharged to transitional care facility.    Progress towards therapy goal(s). See goals on Care Plan in [facility] electronic health record for goal details.  Goals not met.  Barriers to achieving goals:   discharge from facility.    Therapy recommendation(s):    Continued therapy is recommended.  Rationale/Recommendations:  continue skilled OT at TCU.

## 2019-05-15 NOTE — PLAN OF CARE
Physical Therapy Discharge Summary    Reason for therapy discharge:    Discharged to transitional care facility.    Progress towards therapy goal(s). See goals on Care Plan in [facility] electronic health record for goal details.  Goals not met.  Barriers to achieving goals:   limited tolerance for therapy and discharge from facility.    Therapy recommendation(s):    Continued therapy is recommended.  Rationale/Recommendations:  Pt will benefit from continued skilled rehab services to progress indepdndence and safety with functional mobiltiy, prior to return home.;.

## 2019-05-15 NOTE — PROGRESS NOTES
SW:    I: CHARISSA following for discharge planning. Pt will discharge to [facility] today, 5/15/19. CHARISSA faxed discharge orders to facility ([phone]) and updated admissions of discharge time today. [facility] transport arranged for 1200. CHARISSA also left message with pt mental health targeted , Luisa Falk ([phone]) to update on plan for discharge, provided address and phone of TCU. Pt aware of location and time of discharge and is agreeable.    P: Pt to discharge to [facility] via [facility] transport at 1200.    ADDENDUM: Ride time changed to 1700 as facility needs to clarify Clozaril, their pharmacy not licensed to fill this.    DULCE MARIA Sierra, LICSW  Daytime (8:00am-4:30pm): [phone]  After-Hours CHARISSA Pager (4:30pm-11:30pm): [phone]

## 2019-05-15 NOTE — PROGRESS NOTES
General Surgery Progress Note    Admission Date: 5/9/2019  Today's Date: 5/15/2019         Assessment:      Octavio Loya is a 48 year old male with a history of schizophrenia admitted 5/9 with confusion found to have cholecystitis. Underwent lap blaine 5/10 for gangrenous and purulent cholecystitis         Plan:   - Discharge hopefully today, awaiting final approval for TCU/group home  - Ceftin at discharge for total of 2 weeks of antibiotics  - Follow-up hematuria/urinary urgency after discharge  - Continue bowel regimen prn  - Tylenol for pain  - Drain removed yesterday  - Follow-up with Dr. Mejia in 2 weeks        Interval History:   Afebrile, vitals stable. No acute events overnight. Mild pain, controlled. Tolerating diet, no nausea. Still with some complaints of urinary urgency at times, some pink spots noted in diapers.          Physical Exam:   /69 (BP Location: Right arm)   Pulse 70   Temp 98.7  F (37.1  C) (Oral)   Resp 18   Wt 69.2 kg (152 lb 8.9 oz)   SpO2 98%   BMI 22.53 kg/m    I/O last 3 completed shifts:  In: 550 [P.O.:550]  Out: 450 [Emesis/NG output:450]  General: NAD, pleasant, alert awake  Respiratory: non-labored breathing   Abdomen: soft, non tender, non distended  Incision: clean, dry, and intact. Drain site clean and dry    LABS  SPECIMEN(S):   Gallbladder and contents     FINAL DIAGNOSIS:   Gallbladder, cholecystectomy: Gangrenous cholecystitis and cholelithiasis.        Catie Tucker PA-C  [facility]: [phone]  Pager: [phone] (7am-4pm)

## 2019-05-16 NOTE — PLAN OF CARE
AVSS On RA. Pain controlled with PO tylenol. Incisions CDI. LS clear Diet regular. Up with assist of 1. BS active and audible. discharge to rehab.

## 2019-05-17 LAB
BACTERIA SPEC CULT: NORMAL
Lab: NORMAL
SPECIMEN SOURCE: NORMAL

## 2019-06-11 ENCOUNTER — OFFICE VISIT (OUTPATIENT)
Dept: SURGERY | Facility: CLINIC | Age: 49
End: 2019-06-11
Payer: COMMERCIAL

## 2019-06-11 DIAGNOSIS — Z09 FOLLOW-UP EXAMINATION FOLLOWING SURGERY: Primary | ICD-10-CM

## 2019-06-11 PROCEDURE — 99024 POSTOP FOLLOW-UP VISIT: CPT | Performed by: SURGERY

## 2019-06-11 RX ORDER — ARIPIPRAZOLE 5 MG/1
5 TABLET ORAL DAILY
COMMUNITY

## 2019-06-11 NOTE — PROGRESS NOTES
Surgery Postoperative Note    S: Octavio is here for follow up after cholecystectomy. He is doing well. He has some occasional pain at the left upper abdominal incision and some itching from the steri strips.     Abdomen: incision healing well. Steri strips removed. No erythema or induration     Pathology: gangrenous cholecystitis and cholelithiasis    A/P  Octavio Loya is recovering from a difficult laparoscopic Cholecystectomy for gangrenous cholecystitis. I advised him to slowly return to regular activity. I expect he will make a complete recovery without issues. We reviewed his pathology today as well.     Thank you for the opportunity to help in his care.    Vannessa Mejia  [facility]  [phone]    Please route or send letter to:  Primary Care Provider (PCP) and Referring Provider

## 2019-06-12 ENCOUNTER — OFFICE VISIT (OUTPATIENT)
Dept: FAMILY MEDICINE | Facility: CLINIC | Age: 49
End: 2019-06-12
Payer: COMMERCIAL

## 2019-06-12 VITALS
TEMPERATURE: 97.7 F | HEIGHT: 69 IN | BODY MASS INDEX: 22.81 KG/M2 | SYSTOLIC BLOOD PRESSURE: 101 MMHG | DIASTOLIC BLOOD PRESSURE: 70 MMHG | OXYGEN SATURATION: 99 % | WEIGHT: 154 LBS | HEART RATE: 81 BPM

## 2019-06-12 DIAGNOSIS — K81.0 ACUTE CHOLECYSTITIS: Primary | ICD-10-CM

## 2019-06-12 DIAGNOSIS — B96.20 E COLI BACTEREMIA: ICD-10-CM

## 2019-06-12 DIAGNOSIS — R78.81 E COLI BACTEREMIA: ICD-10-CM

## 2019-06-12 DIAGNOSIS — Z90.49 S/P LAPAROSCOPIC CHOLECYSTECTOMY: ICD-10-CM

## 2019-06-12 DIAGNOSIS — F20.9 SCHIZOPHRENIA, UNSPECIFIED TYPE (H): ICD-10-CM

## 2019-06-12 PROCEDURE — 99214 OFFICE O/P EST MOD 30 MIN: CPT | Performed by: INTERNAL MEDICINE

## 2019-06-12 ASSESSMENT — MIFFLIN-ST. JEOR: SCORE: 1553.92

## 2019-06-12 NOTE — PROGRESS NOTES
Octavio Loya is a 49 year old male who presents to clinic today for the following health issues:      Hospital Follow-up Visit:    Hospital/Nursing Home/IP Rehab Facility: [facility]  Date of Admission: 5/9/2019  Date of Discharge: 5/15/2019  Reason(s) for Admission:     Sepsis due to acute gangrenous cholecystitis with E coli bacteremia, s/p Lap blaine  Acute blood loss anemia  Acute metabolic encephalopathy most likely due to cholecystitis with sepsis   History of schizophrenia   Mild acute kidney injury and hyponatremia  History of hypokalemia  Thrombocytopenia         Problems taking medications regularly:  None       Medication changes since discharge: None       Problems adhering to non-medication therapy:  None    Summary of hospitalization:  [facility] discharge summary reviewed  Diagnostic Tests/Treatments reviewed.  Follow up needed: none  Other Healthcare Providers Involved in Patient s Care:         Surgical follow-up appointment - general surgery  Update since discharge: improved.     Post Discharge Medication Reconciliation: discharge medications reconciled, continue medications without change.  Plan of care communicated with patient     Coding guidelines for this visit:  Type of Medical   Decision Making Face-to-Face Visit       within 7 Days of discharge Face-to-Face Visit        within 14 days of discharge   Moderate Complexity [id number]   High Complexity [id number]            HPI:   Patient Octavio Loya is a very pleasant 49 year old male with history of schizophrenia who presents to Internal Medicine clinic today brought in by staff from his group home for post hospital discharge follow up of recent hospitalization for e coli bacteremia due to acute cholecystitis s/p laparoscopic cholecystectomy. Sepsis symptoms have resolved following surgery and hospitalization. The patient is recovering well from the surgery. His abdominal wall surgical wounds are clean, dry and  intact and well healed. No abdominal pains. Regarding his chronic Schizophrenia, the patient denies any acute suicidal ideations or hallucinations. Patient is compliant with his outpatient psych medications. No chest pain, headaches, abdominal pains, fever or chills at this time.        Current Medications:     Current Outpatient Medications   Medication Sig Dispense Refill     ARIPiprazole (ABILIFY) 5 MG tablet Take 5 mg by mouth daily       busPIRone HCl (BUSPAR) 30 MG tablet Take 30 mg by mouth 2 times daily       clonazePAM (KLONOPIN) 1 MG tablet Take 0.5 tablets (0.5 mg) by mouth At Bedtime 30 tablet 0     cloZAPine (CLOZARIL) 100 MG tablet Take 300 mg by mouth every evening At 5 pm       cloZAPine (CLOZARIL) 100 MG tablet Take 500 mg by mouth At Bedtime        levETIRAcetam (KEPPRA) 500 MG tablet Take 500 mg by mouth 2 times daily        multivitamin, therapeutic (THERA-VIT) TABS tablet Take 1 tablet by mouth daily       propranolol (INDERAL) 40 MG tablet Take 40 mg by mouth       ranitidine (ZANTAC) 150 MG tablet Take 150 mg by mouth 2 times daily       sennosides (SENOKOT) 8.6 MG tablet Take 1 tablet by mouth 2 times daily as needed for constipation       sertraline (ZOLOFT) 50 MG tablet Take 50 mg by mouth daily           Allergies:      Allergies   Allergen Reactions     Depakote [Valproic Acid]             Past Medical History:     Past Medical History:   Diagnosis Date     Anxiety      Schizophrenia (H)      Wrist pain, right          Past Surgical History:     Past Surgical History:   Procedure Laterality Date     LAPAROSCOPIC CHOLECYSTECTOMY N/A 5/10/2019    Procedure: CHOLECYSTECTOMY, LAPAROSCOPIC;  Surgeon: Vannessa Mejia MD;  Location:  OR         Family Medical History:   No family history on file.      Social History:     Social History     Socioeconomic History     Marital status: Single     Spouse name: Not on file     Number of children: Not on file     Years of education: Not on file      Highest education level: Not on file   Occupational History     Not on file   Social Needs     Financial resource strain: Not on file     Food insecurity:     Worry: Not on file     Inability: Not on file     Transportation needs:     Medical: Not on file     Non-medical: Not on file   Tobacco Use     Smoking status: Never Smoker     Smokeless tobacco: Current User   Substance and Sexual Activity     Alcohol use: No     Drug use: No     Sexual activity: Not Currently   Lifestyle     Physical activity:     Days per week: Not on file     Minutes per session: Not on file     Stress: Not on file   Relationships     Social connections:     Talks on phone: Not on file     Gets together: Not on file     Attends Gnosticism service: Not on file     Active member of club or organization: Not on file     Attends meetings of clubs or organizations: Not on file     Relationship status: Not on file     Intimate partner violence:     Fear of current or ex partner: Not on file     Emotionally abused: Not on file     Physically abused: Not on file     Forced sexual activity: Not on file   Other Topics Concern     Parent/sibling w/ CABG, MI or angioplasty before 65F 55M? Not Asked   Social History Narrative     Not on file           Review of System:     Constitutional: Negative for fever or chills  Skin: Negative for rashes  Ears/Nose/Throat: Negative for nasal congestion, sore throat  Respiratory: No shortness of breath, dyspnea on exertion, cough, or hemoptysis  Cardiovascular: Negative for chest pain  Gastrointestinal: Negative for nausea, vomiting, abdominal pains. Positive for recent cholecystitis s/p surgery treatment.  Genitourinary: Negative for dysuria, hematuria  Musculoskeletal: Negative for myalgias  Neurologic: Negative for headaches  Psychiatric: positive for chronic schizophrenia  Hematologic/Lymphatic/Immunologic: Negative  Endocrine: Negative  Behavioral: Negative for tobacco use       Physical Exam:   /70 (BP  "Location: Right arm, Patient Position: Sitting, Cuff Size: Adult Regular)   Pulse 81   Temp 97.7  F (36.5  C) (Oral)   Ht 1.753 m (5' 9\")   Wt 69.9 kg (154 lb)   SpO2 99%   BMI 22.74 kg/m      GENERAL: alert and no distress  EYES: eyes grossly normal to inspection, and conjunctivae and sclerae normal  HENT: Normocephalic atraumatic. Nose and mouth without ulcers or lesions  NECK: supple  RESP: lungs clear to auscultation   CV: regular rate and rhythm, normal S1 S2  LYMPH: no peripheral edema   ABDOMEN: nondistended, nontender to palpation. Surgical wounds from recent laparoscopic cholecystectomy surgery is clean, dry and intact and well healed.  MS: no gross musculoskeletal defects noted  SKIN: no suspicious lesions or rashes  NEURO: Alert & Oriented x 3.   PSYCH:flat affect no acute suicidal ideations, no acute hallucinations        Diagnostic Test Results:     Diagnostic Test Results:  Results for orders placed or performed during the hospital encounter of 05/09/19   CT Head w/o Contrast    Narrative    CT SCAN OF THE HEAD WITHOUT CONTRAST   5/9/2019 8:38 AM     HISTORY: Schizophrenia and altered mental status.    TECHNIQUE:  Axial images of the head and coronal reformations without  IV contrast material. Radiation dose for this scan was reduced using  automated exposure control, adjustment of the mA and/or kV according  to patient size, or iterative reconstruction technique.    COMPARISON: None.    FINDINGS: There is no evidence of intracranial hemorrhage, mass, acute  infarct or anomaly. The ventricles are normal in size, shape and  configuration. The brain parenchyma and subarachnoid spaces are  normal.     The visualized portions of the sinuses and mastoids appear normal. The  bony calvarium and bones of the skull base appear intact.       Impression    IMPRESSION:   No evidence of acute intracranial hemorrhage, mass, or  herniation.     ICELO DESOUZA MD   XR Chest 2 Views    Narrative    CHEST TWO VIEWS " May 9, 2019 8:46 AM     HISTORY: Altered mental status and elevated white blood cell count.    COMPARISON: None.    FINDINGS: Heart size and pulmonary vascularity are within normal  limits. The lungs are clear. No pneumothorax or pleural effusion.       Impression    IMPRESSION: No radiographic evidence of acute chest abnormality.     CHRIS RUBIN MD   CT Abdomen Pelvis w Contrast    Narrative    CT ABDOMEN AND PELVIS WITH CONTRAST 5/10/2019 11:12 AM     HISTORY: Abdominal pain, diverticulitis suspected. E coli bacteremia,  left lower quadrant tenderness, leukocytosis.    COMPARISON: None.    TECHNIQUE: Volumetric helical acquisition of CT images from the lung  bases through the symphysis pubis after the administration of 73 mL  Isovue-370  intravenous contrast. Radiation dose for this scan was  reduced using automated exposure control, adjustment of the mA and/or  kV according to patient size, or iterative reconstruction technique.    FINDINGS: Severe inflammatory change and wall thickening of the  gallbladder compatible with severe cholecystitis. Marked splenomegaly  at 18.2 cm. Liver, adrenal glands, and pancreas demonstrate no  worrisome focal lesion. No bowel obstruction. Moderate to large amount  of left-sided stool. Unremarkable appendix. There are no dilated loops  of small intestine or large bowel to suggest ileus or obstruction. No  diverticulitis. Small amount of free fluid. No free intraperitoneal  air. Normal caliber aorta. No hydronephrosis. Survey of the visualized  bony structures demonstrates no destructive bony lesions. The  visualized lung bases are unremarkable.      Impression    IMPRESSION:  1. Marked gallbladder wall thickening and marked surrounding  inflammatory change compatible with acute cholecystitis.  2. Splenomegaly at 18.1 cm.  3. Small amount of pelvic free fluid.  4. Moderate to large amount of left-sided stool.    Results called to Dr. Hammonds on May 10, 2019 at 1:31  PM.    ROB COSBY MD   US Lower Extremity Venous Duplex Bilateral    Narrative    US LOWER EXTREMITY VENOUS DUPLEX BILATERAL5/11/2019 10:29 AM    HISTORY: Rule out deep venous thrombosis. Left-sided chest pain.    TECHNIQUE:  Venous Doppler US.?Color flow and spectral Doppler with  waveform analysis performed.      Impression    IMPRESSION:  No evidence of lower extremity deep venous thrombosis.    STUART GANT MD   CBC with platelets differential   Result Value Ref Range    WBC 20.3 (H) 4.0 - 11.0 10e9/L    RBC Count 4.20 (L) 4.4 - 5.9 10e12/L    Hemoglobin 12.5 (L) 13.3 - 17.7 g/dL    Hematocrit 36.0 (L) 40.0 - 53.0 %    MCV 86 78 - 100 fl    MCH 29.8 26.5 - 33.0 pg    MCHC 34.7 31.5 - 36.5 g/dL    RDW 13.6 10.0 - 15.0 %    Platelet Count 114 (L) 150 - 450 10e9/L    Diff Method Automated Method     % Neutrophils 85.0 %    % Lymphocytes 3.1 %    % Monocytes 11.5 %    % Eosinophils 0.0 %    % Basophils 0.0 %    % Immature Granulocytes 0.4 %    Nucleated RBCs 0 0 /100    Absolute Neutrophil 17.2 (H) 1.6 - 8.3 10e9/L    Absolute Lymphocytes 0.6 (L) 0.8 - 5.3 10e9/L    Absolute Monocytes 2.3 (H) 0.0 - 1.3 10e9/L    Absolute Eosinophils 0.0 0.0 - 0.7 10e9/L    Absolute Basophils 0.0 0.0 - 0.2 10e9/L    Abs Immature Granulocytes 0.1 0 - 0.4 10e9/L    Absolute Nucleated RBC 0.0    Comprehensive metabolic panel   Result Value Ref Range    Sodium 132 (L) 133 - 144 mmol/L    Potassium 3.3 (L) 3.4 - 5.3 mmol/L    Chloride 100 94 - 109 mmol/L    Carbon Dioxide 20 20 - 32 mmol/L    Anion Gap 12 3 - 14 mmol/L    Glucose 246 (H) 70 - 99 mg/dL    Urea Nitrogen 18 7 - 30 mg/dL    Creatinine 1.37 (H) 0.66 - 1.25 mg/dL    GFR Estimate 60 (L) >60 mL/min/[1.73_m2]    GFR Estimate If Black 70 >60 mL/min/[1.73_m2]    Calcium 9.1 8.5 - 10.1 mg/dL    Bilirubin Total 1.1 0.2 - 1.3 mg/dL    Albumin 3.2 (L) 3.4 - 5.0 g/dL    Protein Total 7.7 6.8 - 8.8 g/dL    Alkaline Phosphatase 140 40 - 150 U/L    ALT 18 0 - 70 U/L    AST 12 0 - 45 U/L    UA with Microscopic   Result Value Ref Range    Color Urine Yellow     Appearance Urine Clear     Glucose Urine Negative NEG^Negative mg/dL    Bilirubin Urine Negative NEG^Negative    Ketones Urine Negative NEG^Negative mg/dL    Specific Gravity Urine 1.006 1.003 - 1.035    Blood Urine Negative NEG^Negative    pH Urine 6.5 5.0 - 7.0 pH    Protein Albumin Urine 10 (A) NEG^Negative mg/dL    Urobilinogen mg/dL Normal 0.0 - 2.0 mg/dL    Nitrite Urine Negative NEG^Negative    Leukocyte Esterase Urine Negative NEG^Negative    Source Catheterized Urine     WBC Urine 1 0 - 5 /HPF    RBC Urine <1 0 - 2 /HPF    Bacteria Urine Few (A) NEG^Negative /HPF   Drug abuse screen 77 urine (FL, RH, SH)   Result Value Ref Range    Amphetamine Qual Urine Negative NEG^Negative    Barbiturates Qual Urine Negative NEG^Negative    Benzodiazepine Qual Urine Negative NEG^Negative    Cannabinoids Qual Urine Negative NEG^Negative    Cocaine Qual Urine Negative NEG^Negative    Opiates Qualitative Urine Negative NEG^Negative    PCP Qual Urine Negative NEG^Negative   Alcohol ethyl   Result Value Ref Range    Ethanol g/dL <0.01 <0.01 g/dL   Salicylate level   Result Value Ref Range    Salicylate Level <2 mg/dL   Acetaminophen level   Result Value Ref Range    Acetaminophen Level <2 mg/L   Lactic acid   Result Value Ref Range    Lactic Acid 3.6 (H) 0.4 - 2.0 mmol/L   Lactic acid whole blood   Result Value Ref Range    Lactic Acid 1.2 0.7 - 2.0 mmol/L   CBC with platelets   Result Value Ref Range    WBC 8.8 4.0 - 11.0 10e9/L    RBC Count 3.70 (L) 4.4 - 5.9 10e12/L    Hemoglobin 11.0 (L) 13.3 - 17.7 g/dL    Hematocrit 32.2 (L) 40.0 - 53.0 %    MCV 87 78 - 100 fl    MCH 29.7 26.5 - 33.0 pg    MCHC 34.2 31.5 - 36.5 g/dL    RDW 14.0 10.0 - 15.0 %    Platelet Count 98 (L) 150 - 450 10e9/L   Hemoglobin A1c   Result Value Ref Range    Hemoglobin A1C 4.9 0 - 5.6 %   Potassium   Result Value Ref Range    Potassium 3.7 3.4 - 5.3 mmol/L   Urea nitrogen    Result Value Ref Range    Urea Nitrogen 14 7 - 30 mg/dL   Calcium   Result Value Ref Range    Calcium 8.2 (L) 8.5 - 10.1 mg/dL   Chloride   Result Value Ref Range    Chloride 113 (H) 94 - 109 mmol/L   Co2 Total   Result Value Ref Range    Carbon Dioxide 21 20 - 32 mmol/L   Creatinine   Result Value Ref Range    Creatinine 1.05 0.66 - 1.25 mg/dL    GFR Estimate 83 >60 mL/min/[1.73_m2]    GFR Estimate If Black >90 >60 mL/min/[1.73_m2]   Glucose   Result Value Ref Range    Glucose 92 70 - 99 mg/dL   Sodium   Result Value Ref Range    Sodium 144 133 - 144 mmol/L   Basic metabolic panel   Result Value Ref Range    Sodium 141 133 - 144 mmol/L    Potassium 3.8 3.4 - 5.3 mmol/L    Chloride 112 (H) 94 - 109 mmol/L    Carbon Dioxide 23 20 - 32 mmol/L    Anion Gap 6 3 - 14 mmol/L    Glucose 99 70 - 99 mg/dL    Urea Nitrogen 10 7 - 30 mg/dL    Creatinine 0.87 0.66 - 1.25 mg/dL    GFR Estimate >90 >60 mL/min/[1.73_m2]    GFR Estimate If Black >90 >60 mL/min/[1.73_m2]    Calcium 7.8 (L) 8.5 - 10.1 mg/dL   CBC with platelets   Result Value Ref Range    WBC 7.7 4.0 - 11.0 10e9/L    RBC Count 3.07 (L) 4.4 - 5.9 10e12/L    Hemoglobin 9.1 (L) 13.3 - 17.7 g/dL    Hematocrit 26.5 (L) 40.0 - 53.0 %    MCV 86 78 - 100 fl    MCH 29.6 26.5 - 33.0 pg    MCHC 34.3 31.5 - 36.5 g/dL    RDW 14.1 10.0 - 15.0 %    Platelet Count 77 (L) 150 - 450 10e9/L   INR   Result Value Ref Range    INR 1.36 (H) 0.86 - 1.14   Lactic acid whole blood   Result Value Ref Range    Lactic Acid 0.6 (L) 0.7 - 2.0 mmol/L   Partial thromboplastin time   Result Value Ref Range    PTT 51 (H) 22 - 37 sec   Surgical pathology exam   Result Value Ref Range    Copath Report       Patient Name: MANE CONLEY  MR#: [id number]  Specimen #: [id number]  Collected: 5/10/2019  Received: 5/13/2019  Reported: 5/14/2019 13:45  Ordering Phy(s): JOSE NOVA    For improved result formatting, select 'View Enhanced Report Format' under   Linked Documents  "section.    SPECIMEN(S):  Gallbladder and contents    FINAL DIAGNOSIS:  Gallbladder, cholecystectomy: Gangrenous cholecystitis and cholelithiasis.    Electronically signed out by:    Luis Sandoval M.D.    GROSS:  The specimen is received in formalin, labeled with the patient's name and   date of birth, and designated  \"Gallbladder and contents\".  It consists of a purple gray, focally ragged   and disrupted gallbladder, measuring  8.5 x 5.0 x 3.0 cm. The cystic duct is not appreciated grossly; a metallic   staple line is noted in the neck  and the underlying tissue is shaved and submitted as a potential margin.    The specimen is opened to reveal no  bile, and a single 0.9 cm green tan, bosselated calculus in the nec k of   the specimen.  The mucosa is  variegated green tan to brown, markedly ragged, and focally eroded.  No   discrete normal mucosa is identified.  The wall is diffusely thickened measures up to 1.0 cm in thickness. No   discrete lesions are identified.  Representative sections are submitted.    Summary of Sections:  A1 - potential margin underlying staple line, en face  A2-A5 - representative gallbladder wall (Dictated by: DOMINIQUE Pineda([facility]) 5/13/2019 10:24 AM)    MICROSCOPIC:  Sections demonstrate disrupted gallbladder wall with marked acute and   chronic inflammation and necrosis,  consistent with gangrenous cholecystitis.  There is no malignancy.    The technical component of this testing was completed at the [facility], with the professional component performed   at the [facility], [address] [place] [address]  [phone] ([phone])    CPT Codes:  A: 58396-KC5    COLLECTION SI TE:  Client: [facility]  Location: [facility] (S)       Glucose by meter   Result Value Ref Range    Glucose 95 70 - 99 mg/dL   Glucose by meter   Result Value Ref Range    Glucose 107 (H) 70 - 99 mg/dL "   Clozapine and Metabolites Quant   Result Value Ref Range    Clozapine Quant 2,308 ng/mL    Norclozapine Quant 1,066 ng/mL    Clozapine-N-Oxide Quant 272 ng/mL    Clozapine and Metabolites Total 3,646 (H) <=1,500 ng/mL   Basic metabolic panel   Result Value Ref Range    Sodium 143 133 - 144 mmol/L    Potassium 3.7 3.4 - 5.3 mmol/L    Chloride 112 (H) 94 - 109 mmol/L    Carbon Dioxide 25 20 - 32 mmol/L    Anion Gap 6 3 - 14 mmol/L    Glucose 84 70 - 99 mg/dL    Urea Nitrogen 8 7 - 30 mg/dL    Creatinine 0.76 0.66 - 1.25 mg/dL    GFR Estimate >90 >60 mL/min/[1.73_m2]    GFR Estimate If Black >90 >60 mL/min/[1.73_m2]    Calcium 7.9 (L) 8.5 - 10.1 mg/dL   CBC with platelets   Result Value Ref Range    WBC 6.3 4.0 - 11.0 10e9/L    RBC Count 3.06 (L) 4.4 - 5.9 10e12/L    Hemoglobin 9.1 (L) 13.3 - 17.7 g/dL    Hematocrit 26.6 (L) 40.0 - 53.0 %    MCV 87 78 - 100 fl    MCH 29.7 26.5 - 33.0 pg    MCHC 34.2 31.5 - 36.5 g/dL    RDW 14.2 10.0 - 15.0 %    Platelet Count 84 (L) 150 - 450 10e9/L   Hepatic panel   Result Value Ref Range    Bilirubin Direct 0.2 0.0 - 0.2 mg/dL    Bilirubin Total 0.5 0.2 - 1.3 mg/dL    Albumin 2.0 (L) 3.4 - 5.0 g/dL    Protein Total 5.0 (L) 6.8 - 8.8 g/dL    Alkaline Phosphatase 105 40 - 150 U/L    ALT 24 0 - 70 U/L    AST 22 0 - 45 U/L   Glucose by meter   Result Value Ref Range    Glucose 97 70 - 99 mg/dL   Glucose by meter   Result Value Ref Range    Glucose 82 70 - 99 mg/dL   Glucose by meter   Result Value Ref Range    Glucose 69 (L) 70 - 99 mg/dL   Glucose by meter   Result Value Ref Range    Glucose 69 (L) 70 - 99 mg/dL   Glucose by meter   Result Value Ref Range    Glucose 78 70 - 99 mg/dL   Glucose by meter   Result Value Ref Range    Glucose 107 (H) 70 - 99 mg/dL   Lactic acid level STAT for sepsis protocol   Result Value Ref Range    Lactate for Sepsis Protocol 0.9 0.7 - 2.0 mmol/L   CBC with platelets   Result Value Ref Range    WBC 6.2 4.0 - 11.0 10e9/L    RBC Count 3.17 (L) 4.4 - 5.9  10e12/L    Hemoglobin 9.3 (L) 13.3 - 17.7 g/dL    Hematocrit 27.6 (L) 40.0 - 53.0 %    MCV 87 78 - 100 fl    MCH 29.3 26.5 - 33.0 pg    MCHC 33.7 31.5 - 36.5 g/dL    RDW 14.1 10.0 - 15.0 %    Platelet Count 115 (L) 150 - 450 10e9/L     *Note: Due to a large number of results and/or encounters for the requested time period, some results have not been displayed. A complete set of results can be found in Results Review.       ASSESSMENT/PLAN:   (Z90.49) S/P laparoscopic cholecystectomy  (R78.81) E coli bacteremia  (K81.0) Acute cholecystitis  (primary encounter diagnosis)  Comment: post hospital discharge follow up of recent hospitalization for e coli bacteremia due to acute cholecystitis s/p laparoscopic cholecystectomy. Sepsis symptoms have resolved following surgery and hospitalization. The patient is recovering well from the surgery. His abdominal wall surgical wounds are clean, dry and intact and well healed. No abdominal pains.   Plan: no further need for antibiotics therapy at this time. continue outpatient general surgery clinic follow up going forward.      (F20.9) Schizophrenia, unspecified type (H)  Comment: no acute suicidal ideations or hallucinations. Patient is compliant with his outpatient psych medications  Plan: continue current outpatient psych medications and psychiatry clinic follow up going forward.      Follow Up Plan:     Patient is instructed to return to Internal Medicine clinic for follow-up visit in 1 month.        Olga Silva MD  Internal Medicine  [facility]

## 2019-07-01 ENCOUNTER — MEDICAL CORRESPONDENCE (OUTPATIENT)
Dept: HEALTH INFORMATION MANAGEMENT | Facility: CLINIC | Age: 49
End: 2019-07-01

## 2019-07-17 ENCOUNTER — MEDICAL CORRESPONDENCE (OUTPATIENT)
Dept: HEALTH INFORMATION MANAGEMENT | Facility: CLINIC | Age: 49
End: 2019-07-17

## 2019-08-29 ENCOUNTER — HOSPITAL ENCOUNTER (EMERGENCY)
Facility: CLINIC | Age: 49
End: 2019-08-29
Payer: COMMERCIAL

## 2022-07-03 NOTE — PLAN OF CARE
Discharge Planner PT   Patient plan for discharge: Not stated  Current status: Orders received, chart reviewed, PT evaluation completed and treatment initiated. Patient admitted on 5/9/19 for Severe sepsis secondary to acute gangrenous cholecystitis with E coli bacteremia; s/p laparoscopic cholecystectomy on 5/10. At baseline, patient lives in a group home with 6 other individuals with 10 stairs down to laundry. Patient states he is independent with mobility/ambulation with no AD.     Patient supine in bed upon arrival of therapist, agreeable to working with PT. Supine>sit with log rolling technique for abdominal precautions and Min A at shoulders. Sit>stand from EOB with CGA and no AD, then reaching for IV pole for support. Patient ambulated 75 feet with use of IV pole for support and CGA to Min A at belt for balance assist; somewhat narrow base of support and scissoring noted at times; needing most assist for turns. Patient completed seated exercises x 20 reps at EOB, then returned to supine at end of session with all needs in reach and bed alarm on.   Barriers to return to prior living situation: Current level of A, decreased tolerance to activity   Recommendations for discharge: TCU   Rationale for recommendations: Patient requiring increased level of A for all mobility and ambulation at this time. Patient would benefit from continued skilled therapy at TCU to further improve strength, balance, and independence with mobility and ambulation to address functional limitations and decrease falls risk. If group home able to provide 24 hour A x 1 for all mobility and ambulation, patient may be safe to return home with  PT, otherwise would require discharge to TCU. Will continue to update recommendations as appropriate.       Entered by: Karon Martines 05/12/2019 11:23 AM       show

## 2023-12-11 NOTE — ANESTHESIA CARE TRANSFER NOTE
Mother is calling about the patient having a dry cough for about 2 days. Mother would like to have the patient seen by her PCP.    Please return call to schedule   Patient: Octavio Loya    Procedure(s):  CHOLECYSTECTOMY, LAPAROSCOPIC    Diagnosis: unknown  Diagnosis Additional Information: No value filed.    Anesthesia Type:   General, RSI, ETT     Note:  Airway :ETT  Patient transferred to:PACU  Comments: To pacu intubated on T-piece. Extubated upon arrival. Report given to RN assuming care of ptHandoff Report: Identifed the Patient, Identified the Reponsible Provider, Reviewed the pertinent medical history, Discussed the surgical course, Reviewed Intra-OP anesthesia mangement and issues during anesthesia, Set expectations for post-procedure period and Allowed opportunity for questions and acknowledgement of understanding      Vitals: (Last set prior to Anesthesia Care Transfer)    CRNA VITALS  5/10/2019 1801 - 5/10/2019 1831      5/10/2019             Resp Rate (set):  10                Electronically Signed By: TAY Del Angel CRNA  May 10, 2019  6:31 PM

## (undated) DEVICE — ENDO APPLICATOR SURGIFLO PLASMA COBLATION MS1995

## (undated) DEVICE — ENDO TROCAR FIRST ENTRY KII FIOS Z-THRD 12X100MM CTF73

## (undated) DEVICE — EVAC SYSTEM CLEAR FLOW SC082500

## (undated) DEVICE — PACK LAP CHOLE SLC15LCFSD

## (undated) DEVICE — ESU GROUND PAD UNIVERSAL W/O CORD

## (undated) DEVICE — DRSG GAUZE 4X4" 3033

## (undated) DEVICE — SOL WATER IRRIG 1000ML BOTTLE 2F7114

## (undated) DEVICE — GLOVE PROTEXIS MICRO 6.0  2D73PM60

## (undated) DEVICE — ENDO TROCAR FIRST ENTRY KII FIOS Z-THRD 05X100MM CTF03

## (undated) DEVICE — SYSTEM CLEARIFY VISUALIZATION 21-345

## (undated) DEVICE — DRAIN JACKSON PRATT 15FR ROUND SU130-1323

## (undated) DEVICE — DRAIN JACKSON PRATT 07MM FLAT 3/4 PERF

## (undated) DEVICE — LINEN TOWEL PACK X5 5464

## (undated) DEVICE — SU MONOCRYL 4-0 PS-2 18" UND Y496G

## (undated) DEVICE — GLOVE PROTEXIS BLUE W/NEU-THERA 6.5  2D73EB65

## (undated) DEVICE — SURGICEL POWDER ABSORBABLE HEMOSTAT 3GM 3013SP

## (undated) DEVICE — CLIP APPLIER ENDO 5MM M/L LIGAMAX EL5ML

## (undated) DEVICE — PREP CHLORAPREP 26ML TINTED ORANGE  260815

## (undated) DEVICE — SUCTION IRR STRYKERFLOW II W/TIP 250-070-520

## (undated) DEVICE — DRAIN JACKSON PRATT RESERVOIR 100ML SU130-1305

## (undated) DEVICE — STPL RELOAD REG TISSUE ECHELON 60 X 3.6MM BLUE GST60B

## (undated) DEVICE — SU VICRYL 0 UR-6 27" J603H

## (undated) DEVICE — SOL NACL 0.9% INJ 1000ML BAG 2B1324X

## (undated) DEVICE — ESU HOLDER LAP INST DISP PURPLE LONG 330MM H-PRO-330

## (undated) DEVICE — STPL ENDO ARTICULATING 60MM EC60A

## (undated) DEVICE — ENDO POUCH UNIV RETRIEVAL SYSTEM INZII 10MM CD001

## (undated) DEVICE — SUCTION CANISTER MEDIVAC LINER 3000ML W/LID 65651-530

## (undated) DEVICE — ENDO TROCAR SLEEVE KII Z-THREADED 05X100MM CTS02

## (undated) RX ORDER — HYDROMORPHONE HYDROCHLORIDE 1 MG/ML
INJECTION, SOLUTION INTRAMUSCULAR; INTRAVENOUS; SUBCUTANEOUS
Status: DISPENSED
Start: 2019-05-10

## (undated) RX ORDER — FENTANYL CITRATE 50 UG/ML
INJECTION, SOLUTION INTRAMUSCULAR; INTRAVENOUS
Status: DISPENSED
Start: 2019-05-10

## (undated) RX ORDER — ALBUMIN, HUMAN INJ 5% 5 %
SOLUTION INTRAVENOUS
Status: DISPENSED
Start: 2019-05-10

## (undated) RX ORDER — PIPERACILLIN SODIUM, TAZOBACTAM SODIUM 3; .375 G/15ML; G/15ML
INJECTION, POWDER, LYOPHILIZED, FOR SOLUTION INTRAVENOUS
Status: DISPENSED
Start: 2019-05-10

## (undated) RX ORDER — ONDANSETRON 2 MG/ML
INJECTION INTRAMUSCULAR; INTRAVENOUS
Status: DISPENSED
Start: 2019-05-10

## (undated) RX ORDER — BUPIVACAINE HYDROCHLORIDE AND EPINEPHRINE 5; 5 MG/ML; UG/ML
INJECTION, SOLUTION EPIDURAL; INTRACAUDAL; PERINEURAL
Status: DISPENSED
Start: 2019-05-10

## (undated) RX ORDER — DEXAMETHASONE SODIUM PHOSPHATE 4 MG/ML
INJECTION, SOLUTION INTRA-ARTICULAR; INTRALESIONAL; INTRAMUSCULAR; INTRAVENOUS; SOFT TISSUE
Status: DISPENSED
Start: 2019-05-10

## (undated) RX ORDER — PROPOFOL 10 MG/ML
INJECTION, EMULSION INTRAVENOUS
Status: DISPENSED
Start: 2019-05-10

## (undated) RX ORDER — LIDOCAINE HYDROCHLORIDE 20 MG/ML
INJECTION, SOLUTION EPIDURAL; INFILTRATION; INTRACAUDAL; PERINEURAL
Status: DISPENSED
Start: 2019-05-10

## (undated) RX ORDER — LIDOCAINE HYDROCHLORIDE 10 MG/ML
INJECTION, SOLUTION EPIDURAL; INFILTRATION; INTRACAUDAL; PERINEURAL
Status: DISPENSED
Start: 2019-05-10